# Patient Record
Sex: FEMALE | Race: WHITE | NOT HISPANIC OR LATINO | Employment: UNEMPLOYED | URBAN - METROPOLITAN AREA
[De-identification: names, ages, dates, MRNs, and addresses within clinical notes are randomized per-mention and may not be internally consistent; named-entity substitution may affect disease eponyms.]

---

## 2017-04-06 ENCOUNTER — GENERIC CONVERSION - ENCOUNTER (OUTPATIENT)
Dept: OTHER | Facility: OTHER | Age: 57
End: 2017-04-06

## 2017-04-25 ENCOUNTER — GENERIC CONVERSION - ENCOUNTER (OUTPATIENT)
Dept: OTHER | Facility: OTHER | Age: 57
End: 2017-04-25

## 2017-05-12 ENCOUNTER — ALLSCRIPTS OFFICE VISIT (OUTPATIENT)
Dept: OTHER | Facility: OTHER | Age: 57
End: 2017-05-12

## 2017-05-12 DIAGNOSIS — Z12.31 ENCOUNTER FOR SCREENING MAMMOGRAM FOR MALIGNANT NEOPLASM OF BREAST: ICD-10-CM

## 2017-08-01 ENCOUNTER — HOSPITAL ENCOUNTER (OUTPATIENT)
Dept: MAMMOGRAPHY | Facility: CLINIC | Age: 57
Discharge: HOME | End: 2017-08-01
Payer: COMMERCIAL

## 2017-08-01 DIAGNOSIS — Z12.31 ENCOUNTER FOR SCREENING MAMMOGRAM FOR MALIGNANT NEOPLASM OF BREAST: ICD-10-CM

## 2017-08-01 PROCEDURE — G0202 SCR MAMMO BI INCL CAD: HCPCS

## 2017-08-01 PROCEDURE — 77063 BREAST TOMOSYNTHESIS BI: CPT

## 2017-08-05 ENCOUNTER — GENERIC CONVERSION - ENCOUNTER (OUTPATIENT)
Dept: OTHER | Facility: OTHER | Age: 57
End: 2017-08-05

## 2017-09-06 ENCOUNTER — ALLSCRIPTS OFFICE VISIT (OUTPATIENT)
Dept: OTHER | Facility: OTHER | Age: 57
End: 2017-09-06

## 2017-09-11 RX ORDER — LANOLIN ALCOHOL/MO/W.PET/CERES
CREAM (GRAM) TOPICAL DAILY
COMMUNITY
End: 2018-05-07 | Stop reason: SDUPTHER

## 2017-09-11 RX ORDER — COVID-19 ANTIGEN TEST
KIT MISCELLANEOUS AS NEEDED
COMMUNITY
End: 2022-02-21 | Stop reason: CLARIF

## 2017-09-11 RX ORDER — GABAPENTIN 300 MG/1
100 CAPSULE ORAL 3 TIMES DAILY
COMMUNITY
End: 2018-05-03 | Stop reason: CLARIF

## 2017-09-14 ENCOUNTER — ANESTHESIA EVENT (OUTPATIENT)
Dept: PERIOP | Facility: HOSPITAL | Age: 57
End: 2017-09-14
Payer: COMMERCIAL

## 2017-09-15 ENCOUNTER — HOSPITAL ENCOUNTER (OUTPATIENT)
Facility: HOSPITAL | Age: 57
Setting detail: OUTPATIENT SURGERY
Discharge: HOME | End: 2017-09-15
Attending: INTERNAL MEDICINE | Admitting: INTERNAL MEDICINE
Payer: COMMERCIAL

## 2017-09-15 ENCOUNTER — ANESTHESIA (OUTPATIENT)
Dept: PERIOP | Facility: HOSPITAL | Age: 57
End: 2017-09-15
Payer: COMMERCIAL

## 2017-09-15 ENCOUNTER — GENERIC CONVERSION - ENCOUNTER (OUTPATIENT)
Dept: OTHER | Facility: OTHER | Age: 57
End: 2017-09-15

## 2017-09-15 VITALS
BODY MASS INDEX: 24.57 KG/M2 | HEART RATE: 62 BPM | WEIGHT: 156.53 LBS | RESPIRATION RATE: 17 BRPM | SYSTOLIC BLOOD PRESSURE: 138 MMHG | DIASTOLIC BLOOD PRESSURE: 86 MMHG | OXYGEN SATURATION: 98 % | HEIGHT: 67 IN | TEMPERATURE: 97.1 F

## 2017-09-15 RX ORDER — PROPOFOL 10 MG/ML
INJECTION, EMULSION INTRAVENOUS CONTINUOUS PRN
Status: DISCONTINUED | OUTPATIENT
Start: 2017-09-15 | End: 2017-09-15

## 2017-09-15 RX ORDER — SODIUM CHLORIDE, SODIUM LACTATE, POTASSIUM CHLORIDE, CALCIUM CHLORIDE 600; 310; 30; 20 MG/100ML; MG/100ML; MG/100ML; MG/100ML
125 INJECTION, SOLUTION INTRAVENOUS CONTINUOUS
Status: DISCONTINUED | OUTPATIENT
Start: 2017-09-15 | End: 2017-09-15 | Stop reason: HOSPADM

## 2017-09-15 RX ORDER — PROPOFOL 10 MG/ML
INJECTION, EMULSION INTRAVENOUS AS NEEDED
Status: DISCONTINUED | OUTPATIENT
Start: 2017-09-15 | End: 2017-09-15 | Stop reason: SURG

## 2017-09-15 RX ADMIN — PROPOFOL 130 MG: 10 INJECTION, EMULSION INTRAVENOUS at 09:46

## 2017-09-15 RX ADMIN — PROPOFOL 20 MG: 10 INJECTION, EMULSION INTRAVENOUS at 09:49

## 2017-09-15 RX ADMIN — SODIUM CHLORIDE, POTASSIUM CHLORIDE, SODIUM LACTATE AND CALCIUM CHLORIDE 125 ML/HR: 600; 310; 30; 20 INJECTION, SOLUTION INTRAVENOUS at 09:14

## 2017-09-15 RX ADMIN — SODIUM CHLORIDE, POTASSIUM CHLORIDE, SODIUM LACTATE AND CALCIUM CHLORIDE: 600; 310; 30; 20 INJECTION, SOLUTION INTRAVENOUS at 09:44

## 2017-11-02 ENCOUNTER — ALLSCRIPTS OFFICE VISIT (OUTPATIENT)
Dept: OTHER | Facility: OTHER | Age: 57
End: 2017-11-02

## 2017-11-02 ENCOUNTER — GENERIC CONVERSION - ENCOUNTER (OUTPATIENT)
Dept: OTHER | Facility: OTHER | Age: 57
End: 2017-11-02

## 2018-01-12 VITALS
OXYGEN SATURATION: 95 % | HEIGHT: 66 IN | HEART RATE: 73 BPM | SYSTOLIC BLOOD PRESSURE: 128 MMHG | BODY MASS INDEX: 25.94 KG/M2 | WEIGHT: 161.38 LBS | DIASTOLIC BLOOD PRESSURE: 84 MMHG

## 2018-01-14 VITALS
SYSTOLIC BLOOD PRESSURE: 122 MMHG | HEART RATE: 76 BPM | DIASTOLIC BLOOD PRESSURE: 80 MMHG | WEIGHT: 165.5 LBS | HEIGHT: 66 IN | BODY MASS INDEX: 26.6 KG/M2

## 2018-01-17 NOTE — RESULT NOTES
Verified Results  MAMMO SCREENING BILATERAL W 3D & CAD [id number] 08:49AM Chele Shankar Order Number: [id number]    - Patient Instructions: To schedule this appointment, please contact Central Scheduling at [phone]  Do not wear any perfume, powder, lotion or deodorant on breast or underarm area  Please bring your doctors order, referral (if needed) and insurance information with you on the day of the test  Failure to bring this information may result in this test being rescheduled  Arrive 15 minutes prior to your appointment time to register  On the day of your test, please bring any prior mammogram or breast studies with you that were not performed at a [facility]  Failure to bring prior exams may result in your test needing to be rescheduled  Test Name Result Flag Reference   MAMMO SCREENING BILATERAL W 3D & CAD (Report)     Patient History:   Patient is postmenopausal, has history of colorectal cancer at    age 62, and had previous chemotherapy  No known family history of cancer  Patient is a former smoker  Patient's BMI is 25 4  Reason for exam: screening, asymptomatic  Mammo Screening Bilateral W DBT and CAD: August 1, 2017 - Check    In #: [de-identified]   2D/3D Procedure   3D views: Bilateral MLO view(s) were taken  2D views: Bilateral MLO and CC view(s) were taken  Technologist: MAXIMO Guerrero (R)(M)   No prior studies available for comparison  There are scattered fibroglandular densities  A combination of    mediolateral oblique 3-D tomographic slices as well as standard    two-dimensional orthogonal images were obtained  No dominant soft tissue mass or suspicious calcifications are    noted  The skin and nipple contours are within normal limits  No mammographic evidence of malignancy  ACR BI-RADSï¾® Assessments: BiRad:1 - Negative   A reminder letter will be scheduled  Recommendation:   Routine screening mammogram in 1 year       The patient is scheduled in a reminder system  8-10% of cancers will be missed on mammography  Management of a    palpable abnormality must be based on clinical grounds  Patients    will be notified of their results via letter from our facility  Accredited by [employer] and FDA       Transcription Location: MAXIMO Werner 98: [id number]     Risk Value(s):   Nithiner-Cuabdonck 10 Year: 2 800%, Tyrer-Cuzick Lifetime: 7 700%,    Myriad Table: 1 5%, LANETTE 5 Year: 1 1%, NCI Lifetime: 6 3%   Signed by:   Sascha Cruz MD   8/1/17

## 2018-01-22 VITALS
WEIGHT: 164.38 LBS | OXYGEN SATURATION: 98 % | HEART RATE: 70 BPM | DIASTOLIC BLOOD PRESSURE: 76 MMHG | HEIGHT: 67 IN | SYSTOLIC BLOOD PRESSURE: 126 MMHG | BODY MASS INDEX: 25.8 KG/M2

## 2018-05-03 PROBLEM — T45.1X5A CHEMOTHERAPY-INDUCED NEUROPATHY (HCC): Status: ACTIVE | Noted: 2017-05-12

## 2018-05-03 PROBLEM — T45.1X5A CHEMOTHERAPY-INDUCED NEUROPATHY (HCC): Chronic | Status: ACTIVE | Noted: 2017-05-12

## 2018-05-03 PROBLEM — G62.0 CHEMOTHERAPY-INDUCED NEUROPATHY (HCC): Chronic | Status: ACTIVE | Noted: 2017-05-12

## 2018-05-03 PROBLEM — Z92.21 STATUS POST CHEMORADIATION: Chronic | Status: ACTIVE | Noted: 2018-05-03

## 2018-05-03 PROBLEM — G62.0 CHEMOTHERAPY-INDUCED NEUROPATHY (HCC): Status: ACTIVE | Noted: 2017-05-12

## 2018-05-03 PROBLEM — C18.7 MALIGNANT NEOPLASM OF SIGMOID COLON (HCC): Status: ACTIVE | Noted: 2017-05-12

## 2018-05-03 PROBLEM — C18.7 MALIGNANT NEOPLASM OF SIGMOID COLON (HCC): Chronic | Status: ACTIVE | Noted: 2017-05-12

## 2018-05-03 PROBLEM — Z92.3 STATUS POST CHEMORADIATION: Chronic | Status: ACTIVE | Noted: 2018-05-03

## 2018-05-03 RX ORDER — GABAPENTIN 300 MG/1
1 CAPSULE ORAL 3 TIMES DAILY
COMMUNITY
End: 2018-05-07 | Stop reason: SDUPTHER

## 2018-05-07 ENCOUNTER — APPOINTMENT (OUTPATIENT)
Dept: LAB | Facility: CLINIC | Age: 58
End: 2018-05-07
Payer: COMMERCIAL

## 2018-05-07 ENCOUNTER — OFFICE VISIT (OUTPATIENT)
Dept: INTERNAL MEDICINE CLINIC | Facility: CLINIC | Age: 58
End: 2018-05-07
Payer: COMMERCIAL

## 2018-05-07 ENCOUNTER — TRANSCRIBE ORDERS (OUTPATIENT)
Dept: LAB | Facility: CLINIC | Age: 58
End: 2018-05-07

## 2018-05-07 VITALS
HEIGHT: 66 IN | WEIGHT: 163.6 LBS | BODY MASS INDEX: 26.29 KG/M2 | HEART RATE: 68 BPM | OXYGEN SATURATION: 95 % | SYSTOLIC BLOOD PRESSURE: 136 MMHG | DIASTOLIC BLOOD PRESSURE: 86 MMHG

## 2018-05-07 DIAGNOSIS — Z11.59 NEED FOR HEPATITIS C SCREENING TEST: ICD-10-CM

## 2018-05-07 DIAGNOSIS — R35.0 URINARY FREQUENCY: ICD-10-CM

## 2018-05-07 DIAGNOSIS — G62.0 CHEMOTHERAPY-INDUCED NEUROPATHY (HCC): Primary | Chronic | ICD-10-CM

## 2018-05-07 DIAGNOSIS — C18.7 MALIGNANT NEOPLASM OF SIGMOID COLON (HCC): Chronic | ICD-10-CM

## 2018-05-07 DIAGNOSIS — T45.1X5A CHEMOTHERAPY-INDUCED NEUROPATHY (HCC): Primary | Chronic | ICD-10-CM

## 2018-05-07 DIAGNOSIS — Z00.00 ADULT GENERAL MEDICAL EXAMINATION: ICD-10-CM

## 2018-05-07 DIAGNOSIS — Z71.3 DIETARY COUNSELING AND SURVEILLANCE: ICD-10-CM

## 2018-05-07 DIAGNOSIS — Z11.59 SCREENING EXAMINATION FOR POLIOMYELITIS: Primary | ICD-10-CM

## 2018-05-07 DIAGNOSIS — Z13.31 DEPRESSION SCREENING NEGATIVE: ICD-10-CM

## 2018-05-07 LAB
BILIRUB UR QL STRIP: NEGATIVE
CLARITY UR: CLEAR
COLOR UR: YELLOW
GLUCOSE UR STRIP-MCNC: NEGATIVE MG/DL
HGB UR QL STRIP.AUTO: NEGATIVE
KETONES UR STRIP-MCNC: NEGATIVE MG/DL
LEUKOCYTE ESTERASE UR QL STRIP: NEGATIVE
NITRITE UR QL STRIP: NEGATIVE
PH UR STRIP.AUTO: 6 [PH] (ref 4.5–8)
PROT UR STRIP-MCNC: NEGATIVE MG/DL
SP GR UR STRIP.AUTO: 1.01 (ref 1–1.03)
UROBILINOGEN UR QL STRIP.AUTO: 0.2 E.U./DL

## 2018-05-07 PROCEDURE — 36415 COLL VENOUS BLD VENIPUNCTURE: CPT

## 2018-05-07 PROCEDURE — 86803 HEPATITIS C AB TEST: CPT

## 2018-05-07 PROCEDURE — 81003 URINALYSIS AUTO W/O SCOPE: CPT | Performed by: INTERNAL MEDICINE

## 2018-05-07 PROCEDURE — 99214 OFFICE O/P EST MOD 30 MIN: CPT | Performed by: INTERNAL MEDICINE

## 2018-05-07 RX ORDER — LANOLIN ALCOHOL/MO/W.PET/CERES
1000 CREAM (GRAM) TOPICAL DAILY
Qty: 90 TABLET | Refills: 1 | Status: SHIPPED | OUTPATIENT
Start: 2018-05-07 | End: 2018-10-02

## 2018-05-07 RX ORDER — GABAPENTIN 300 MG/1
300 CAPSULE ORAL 3 TIMES DAILY
Qty: 90 CAPSULE | Refills: 3 | Status: SHIPPED | OUTPATIENT
Start: 2018-05-07 | End: 2018-07-18 | Stop reason: ALTCHOICE

## 2018-05-07 NOTE — PATIENT INSTRUCTIONS
Peripheral Neuropathy   WHAT YOU NEED TO KNOW:   What is peripheral neuropathy? Peripheral neuropathy is a condition that affects your nerves  Nerves carry information from your brain to your body  When you have neuropathy, the information does not transfer along your nerves correctly  The nerves in your legs, arms, feet, or hands are affected  It also may affect your organs, such as your lungs, stomach, bladder, or genitals  This condition may go away on its own or you may always have it  What causes peripheral neuropathy? · Trauma or pressure on the nerve: Any accident that causes nerve damage can lead to neuropathy  If you wear a cast or a splint, it may cause pressure that pinches nerves  Repeated movements, such as typing, may cause nerve pressure and pain  · Alcohol abuse:  Alcohol abuse can cause a decrease of vitamins in your body, which can cause neuropathy  · Infections: You may get neuropathy after you are exposed to certain infections, such as herpes and Lyme disease  · Health conditions or treatments:  Certain health conditions, such as rheumatoid arthritis, cancer, lupus, and inherited disorders, may cause neuropathy  Certain medical treatments, such as chemotherapy or surgery, increase your risk of peripheral neuropathy  Some medicines for heart conditions or HIV also put you at risk  Ask for more information about these or other health conditions or treatments  What are the signs and symptoms of peripheral neuropathy? Peripheral neuropathy can affect the nerves that allow you to move or to feel things  It also may affect nerves that control your body functions, such as digestion or urination   The following are common signs and symptoms:  · Pain or tingling in your legs, feet, arms, or hands that may feel sharp, stabbing, or burning     · Trouble walking or keeping your balance     · Weakness or difficulty holding things    · Loss of your sense of touch or numbness    · Bruising easily     · Trouble controlling your bladder or bowels or having sex  How is peripheral neuropathy diagnosed? Your healthcare provider will examine you and ask about your symptoms  He may ask you about your other health conditions and about your family health history  Your healthcare provider may gently touch your skin in different areas with a cotton ball or a pin  This is done to check your sense of touch  He may also check how well you can feel hot and cold  Your healthcare provider will ask you to do simple movements  For example, he may ask you to walk or to move your fingers  You may also have any of the following tests:  · Blood tests: You may need blood taken to check for conditions that may be causing your peripheral neuropathy  · An electromyography (EMG)  test measures the electrical activity of your muscles at rest and with movement  · Nerve conduction studies:  Nerve conduction studies (NCS) test how your nerves respond to stimulation  Electrodes (wires) are placed on affected areas of your body  They send electrical currents into the nerve to see how quickly it responds  · Nerve biopsies:  A sample of skin and nerve tissue is collected by biopsy  Many methods and sites may be used for a biopsy  After a site is selected and cleaned, the area is numbed  A small piece of skin is removed so that nerves in the sample can be examined  Ask for more information about nerve biopsies  How is peripheral neuropathy treated? Treatment may help relieve your pain and help you function in your daily activities  Treatment of the condition causing the peripheral neuropathy may improve your symptoms  You may need the following treatments:  · Medicines:      ¨ Antidepressants: This medicine helps to decrease or stop the symptoms of depression  It also used to help decrease pain  Take this medicine as directed  ¨ Antiseizure medicine:   This medicine is usually given to control seizures, but it also helps with nerve pain  ¨ Pain medicine: You may be given medicine to decrease pain  You may take pain medicine as a pill or apply it to your skin  Do not wait until the pain is severe before you take your medicine  · Physical therapy:  Physical and occupational therapists may help you exercise your arms, legs, and hands  They may teach you new ways to do things at home  · Transcutaneous electrical nerve stimulation: This treatment, called TENS, stimulates your nerves and may decrease your pain  Wires are attached to pads  The pads are attached to your skin  The wires send a mild current through your nerves  Do not have TENS if you have a pacemaker or are pregnant  · Brace or splint:  You may need a device that supports or holds a body part still  For example, if you have carpal tunnel syndrome, you may need to wear a wrist brace  What are the risks of peripheral neuropathy? · The needles used during EMG may cause pain when they are inserted and may leave bruises  If you have a nerve biopsy, you may lose your sense of touch in that area  You may become dependent on some medicines (you feel like you have to take them)  You may still have pain even while you are taking medicines  Peripheral neuropathy may cause permanent damage to your nerves  · You may injure yourself if you cannot control your movements  You may have a poor sense of touch and not be able to grasp things, such as zippers or keys  You may develop muscle or bone weakness and sores from lack of movement  Pain may make you feel upset or cause you to have trouble sleeping  You may get an infection or kidney disease if it is hard for you to control your bladder or bowels  You may have trouble breathing if the nerves that work with your lungs are affected  This can be life-threatening  How can I manage my peripheral neuropathy? · Avoid falls: Move with care and stand up slowly   Wear shoes that support your feet, and do not go barefoot  Ask about walking aids, such as a cane or walker  You may want to install railings or nonslip pads in your home, especially in the bathroom  Ask for more information on how to avoid falls  · Check your skin daily:  Sores can form where your skin makes contact with chairs, beds, or other body parts  They also can form under splints  Keep your skin clean, and check your skin daily for sores  · Exercise:  Ask your healthcare provider about the best exercise plan for you  Physical activity may increase your balance and strength and may decrease your pain  It is best to start exercising slowly and do more as you get stronger  When should I contact my healthcare provider? · Your pain is severe  · You cannot control your bladder  · You have trouble having sex  · You have questions or concerns about your condition or care  When should I seek immediate care or call 911? · You fall  · You cannot walk at all  CARE AGREEMENT:   You have the right to help plan your care  Learn about your health condition and how it may be treated  Discuss treatment options with your caregivers to decide what care you want to receive  You always have the right to refuse treatment  The above information is an  only  It is not intended as medical advice for individual conditions or treatments  Talk to your doctor, nurse or pharmacist before following any medical regimen to see if it is safe and effective for you  © 2017 [address] [employer] [employer] Information is for End User's use only and may not be sold, redistributed or otherwise used for commercial purposes  All illustrations and images included in CareNotes® are the copyrighted property of A D A M , Inc  or Sunil Garcia

## 2018-05-07 NOTE — PROGRESS NOTES
INTERNAL MEDICINE FOLLOW-UP OFFICE VISIT  [facility] [address] [place] [address]    NAME: Neeru Herrmann  AGE: 59 y o  SEX: female  : 1960     DATE: 2018     Assessment and Plan:     1  Chemotherapy-induced neuropathy (HCC)    Check EMG  Set up consult with neurology  Continue gabapentin  Does not want to go up on dose right now  Continue vitamin B12     - cyanocobalamin (VITAMIN B-12) 1,000 mcg tablet; Take 1 tablet (1,000 mcg total) by mouth daily  Dispense: 90 tablet; Refill: 1  - gabapentin (NEURONTIN) 300 mg capsule; Take 1 capsule (300 mg total) by mouth 3 (three) times a day  Dispense: 90 capsule; Refill: 3  - EMG 2 limb lower extremity; Future  - Ambulatory referral to Neurology; Future    2  Malignant neoplasm of sigmoid colon [facility])    Follow-up with oncology as scheduled  Needs repeat colonoscopy in 2019     3  Need for hepatitis C screening test    Check hep C antibody for screening purposes  - Hepatitis C antibody; Future    4  Urinary frequency    No evidence of renal failure or hyperglycemia  Check UA with reflex to culture  - UA w Reflex to Microscopic w Reflex to Culture -Lab Collect    5  Adult general medical examination    Discussed routine exercise and dietary recommendations were given  Should establish with GYN  Needs PAP smear and routine GYN exam     - Ambulatory referral to Obstetrics / Gynecology; Future    Return in about 6 months (around 2018) for Follow-up  Counseling:     · Medication Side Effects - Adverse side effects of medications were reviewed with the patient/guardian today: Yes  · Counseling was given regarding: Diagnostic results, Prognosis, Risks and benefits of tx options, Intructions for management, Patient and family education, Importance of tx compliance, Risk factor reductions and Impressions    · Barriers to treatment include: No identified barriers      Chief Complaint:     Chief Complaint   Patient presents with    Follow-up     6 month       History of Present Illness:     Patient presents for routine follow-up  Has a history of chemotherapy induced neuropathy and sigmoid colon cancer s/p resection  Most recent CT scan showed stability with no new findings  No evidence of any recurrent or metastatic disease  Last colonoscopy in September 2017  She is due for repeat in 2 years  She has neuropathy mostly in hands and lower extremities  Dr Breanne Khoury wanted to set her up with neurologist and get EMG, but she never ended up going  States gabapentin does not help  She also states she gets urinary frequency  Recent glucose was 63  Renal function normal  CBC normal and CEA 1 3  She denies any cardiac symptoms  Gets intermittent lower abdominal pain, but goes away quickly  No remitting or exacerbating factors  She has not had a PAP smear  She does not have a GYN  The following portions of the patient's history were reviewed and updated as appropriate: allergies, current medications, past family history, past medical history, past social history, past surgical history and problem list      Review of Systems:     Review of Systems   Constitutional: Negative for activity change, appetite change and fatigue  Respiratory: Negative for apnea, cough, chest tightness, shortness of breath and wheezing  Cardiovascular: Negative for chest pain, palpitations and leg swelling  Gastrointestinal: Positive for abdominal pain  Negative for abdominal distention, blood in stool, constipation, diarrhea, nausea and vomiting  Musculoskeletal: Negative for arthralgias, back pain, gait problem, joint swelling and myalgias  Skin: Negative for rash and wound  Neurological: Positive for numbness  Negative for dizziness, tremors, seizures, syncope, facial asymmetry, speech difficulty, weakness, light-headedness and headaches     Psychiatric/Behavioral: Negative for behavioral problems, confusion, hallucinations, sleep disturbance and suicidal ideas  The patient is not nervous/anxious  Problem List:     Patient Active Problem List   Diagnosis    Malignant neoplasm of sigmoid colon ([address] )    Chemotherapy-induced neuropathy ([address] )    Status post chemoradiation      Objective:     /86 (BP Location: Left arm, Patient Position: Sitting, Cuff Size: Standard)   Pulse 68   Ht 5' 5 75" (1 67 m)   Wt 74 2 kg (163 lb 9 6 oz)   SpO2 95%   BMI 26 61 kg/m²     Physical Exam   Constitutional: She is oriented to person, place, and time  She appears well-developed and well-nourished  No distress  Eyes: Conjunctivae are normal  Right eye exhibits no discharge  Left eye exhibits no discharge  No scleral icterus  Neck: Neck supple  No JVD present  No thyromegaly present  Cardiovascular: Normal rate, regular rhythm, normal heart sounds and intact distal pulses  Exam reveals no gallop and no friction rub  No murmur heard  Pulmonary/Chest: Effort normal and breath sounds normal  No respiratory distress  She has no wheezes  She has no rales  She exhibits no tenderness  Abdominal: Soft  Bowel sounds are normal  She exhibits no distension and no mass  There is no tenderness  There is no rebound and no guarding  Musculoskeletal: Normal range of motion  She exhibits no edema  Lymphadenopathy:     She has no cervical adenopathy  Neurological: She is alert and oriented to person, place, and time  Skin: Skin is warm and dry  She is not diaphoretic  Psychiatric: She has a normal mood and affect  Her behavior is normal      PHQ-9  Negative for depression with PHQ2 score of 0       Current Medications:     Current Outpatient Prescriptions   Medication Sig Dispense Refill    cyanocobalamin (VITAMIN B-12) 1,000 mcg tablet Take 1 tablet (1,000 mcg total) by mouth daily 90 tablet 1    gabapentin (NEURONTIN) 300 mg capsule Take 1 capsule (300 mg total) by mouth 3 (three) times a day 90 capsule 3    Naproxen Sodium (ALEVE) 220 MG CAPS Take by mouth No current facility-administered medications for this visit          Albert Jane DO  [facility]

## 2018-05-08 ENCOUNTER — TELEPHONE (OUTPATIENT)
Dept: INTERNAL MEDICINE CLINIC | Facility: CLINIC | Age: 58
End: 2018-05-08

## 2018-05-08 LAB — HCV AB SER QL: NORMAL

## 2018-05-08 NOTE — TELEPHONE ENCOUNTER
----- Message from Matilde Au DO sent at 5/8/2018 10:11 AM EDT -----  Let patient know his urinalysis was normal and she has no evidence of hepatitis C in her blood

## 2018-07-18 ENCOUNTER — OFFICE VISIT (OUTPATIENT)
Dept: NEUROLOGY | Facility: CLINIC | Age: 58
End: 2018-07-18
Payer: COMMERCIAL

## 2018-07-18 VITALS
WEIGHT: 167 LBS | HEIGHT: 67 IN | BODY MASS INDEX: 26.21 KG/M2 | HEART RATE: 62 BPM | DIASTOLIC BLOOD PRESSURE: 94 MMHG | SYSTOLIC BLOOD PRESSURE: 136 MMHG

## 2018-07-18 DIAGNOSIS — T45.1X5A CHEMOTHERAPY-INDUCED NEUROPATHY (HCC): Primary | Chronic | ICD-10-CM

## 2018-07-18 DIAGNOSIS — G62.0 CHEMOTHERAPY-INDUCED NEUROPATHY (HCC): Primary | Chronic | ICD-10-CM

## 2018-07-18 PROCEDURE — 99204 OFFICE O/P NEW MOD 45 MIN: CPT | Performed by: PSYCHIATRY & NEUROLOGY

## 2018-07-18 RX ORDER — DULOXETIN HYDROCHLORIDE 30 MG/1
30 CAPSULE, DELAYED RELEASE ORAL DAILY
Qty: 7 CAPSULE | Refills: 0 | Status: SHIPPED | OUTPATIENT
Start: 2018-07-18 | End: 2018-07-19 | Stop reason: SDUPTHER

## 2018-07-18 RX ORDER — DULOXETIN HYDROCHLORIDE 60 MG/1
60 CAPSULE, DELAYED RELEASE ORAL
Qty: 30 CAPSULE | Refills: 1 | Status: SHIPPED | OUTPATIENT
Start: 2018-07-18 | End: 2018-07-19 | Stop reason: SDUPTHER

## 2018-07-18 NOTE — PROGRESS NOTES
Consultation - Neurology   Oliverio Gomez 59 y o  female MRN: [id number]  Unit/Bed#:  Encounter: [id number]      Physician Requesting Consult: No att  providers found  59year old right handed female patient was referred by Dr Zuhair Velarde with a history of polyneuropathy  HPI:  Patient is a 69-year-old right-handed very pleasant lady who comes to us today with a history of having suffered colon cancer 2 years ago subsequent to which she had resection and chemotherapy, and during her course of chemotherapy she started experiencing painful paresthesias in both feet associated with tingling numbness and temperatures  changes  Patient claims over the last 2 years her symptoms have been gradually progressing and also complains of tingling and numbness in her hands  She claims she was seen by her oncologist and started on gabapentin for chemotherapy-induced polyneuropathy  Patient denies any weakness in the upper lower extremities and denies any neck pain or low back pain and also denies any central neurological symptoms  She has not seen any relief with the help of gabapentin and is now advised to discontinue the same  Patient denies any history of diabetes  She is on vitamin B12 supplements on a regular basis  Review of Systems:  Review of Systems   Constitutional: Negative  HENT: Negative  Eyes: Negative  Respiratory: Negative  Cardiovascular: Negative  Gastrointestinal: Positive for abdominal pain  Endocrine: Negative  Genitourinary: Positive for frequency  Musculoskeletal: Negative  Skin: Negative  Allergic/Immunologic: Negative  Neurological: Positive for dizziness, numbness and headaches  Hematological: Bruises/bleeds easily  Psychiatric/Behavioral: The patient is nervous/anxious          Historical Information   Past Medical History:   Diagnosis Date    Chemotherapy[facility] )     Chronic pain disorder     lower back    Incisional hernia     Malignant ne[facility] of [facility]moid colon [facility] [address] ) 10/2015     Past Surgical History:   Procedure Laterality Date    CHOLECYSTECTOMY      COLECTOMY      partial    COLONOSCOPY      EXPLORATORY LAPAROTOMY      HERNIA REPAIR      LAPAROSCOPIC LYSIS INTESTINAL ADHESIONS      LAPAROSCOPIC SMALL BOWEL RESECTION      GA COLONOSCOPY FLX DX W/COLLJ SPEC WHEN PFRMD N/A 9/15/2017    Procedure: COLONOSCOPY;  Surgeon: Yuliana Diamond MD;  Location: [facility]; Service: Gastroenterology    SIGMOIDOSCOPY  10/2015     Social History   History   Smoking Status    Former Smoker    Quit date: 9/15/2015   Smokeless Tobacco    Never Used     History   Alcohol Use No     History   Drug Use No       Family History:   No family history on file  No Known Allergies    Meds:  All current active meds have been reviewed    Scheduled Meds:  PRN Meds:     Physical Exam:   Objective   Vitals: There were no vitals filed for this visit  ,Body mass index is 26 55 kg/m²  General appearance: Cooperative in no acute distress  Head & neck head is atraumatic and normocephalic  Neck is supple with full range of motion  Cardiovascular: Carotid arteries-no carotid bruits  Neurologic:   Patient is alert awake oriented, high functions are intact, speech is fluent  No evidence of any aphasia or dysarthria  Cranial nerve examination reveals visual fields are full to threat, pupils equal and reactive, extraocular movements intact, fundi showed sharp disc margins, sensation in the V1 V2 V3 distribution is symmetric, no obvious facial asymmetry noted,Hearing is preserved, tongue is midline and gag is adequate, shoulder shrug is symmetric bilaterally  Motor examination reveals normal tone and bulk, no evidence of any drift to the outstretched extremities, strength is 5/5 preserved bilaterally in both upper and lower extremities, deep tendon reflexes are intact, toes are downgoing    Sensory examination to pinprick light touch is preserved in both upper and lower extremities, proprioception and vibration is diminished in both lower extremities, patient does not extinguish double simultaneous stimuli  Coordination no evidence of any finger-to-nose dysmetria  Gait is normal based Romberg sign is positive  Patient has no evidence of any spine tenderness  Assessment:  Polyneuropathy, most likely chemotherapy-induced but unlikely to be worsening after 2 years and therefore secondary other etiology should be ruled out  Plan:    Patient is advised EMG studies of both lower extremities, will also check a sed rate protein electrophoresis and Lyme titer  She is advised to discontinue gabapentin and will try her on Cymbalta 30 mg at bedtime for 1 week followed by 60 mg at bedtime daily  Patient is agreeable and she is advised to return back to see me in 2 months  7/18/2018,12:47 PM    Dictation voice to text software has been used in the creation of this document

## 2018-07-19 RX ORDER — DULOXETIN HYDROCHLORIDE 60 MG/1
60 CAPSULE, DELAYED RELEASE ORAL
Qty: 30 CAPSULE | Refills: 1 | Status: SHIPPED | OUTPATIENT
Start: 2018-07-19 | End: 2018-08-30 | Stop reason: SDUPTHER

## 2018-07-19 RX ORDER — DULOXETIN HYDROCHLORIDE 30 MG/1
30 CAPSULE, DELAYED RELEASE ORAL DAILY
Qty: 7 CAPSULE | Refills: 0 | Status: SHIPPED | OUTPATIENT
Start: 2018-07-19 | End: 2018-10-02 | Stop reason: SINTOL

## 2018-08-01 ENCOUNTER — PROCEDURE VISIT (OUTPATIENT)
Dept: NEUROLOGY | Facility: CLINIC | Age: 58
End: 2018-08-01
Payer: COMMERCIAL

## 2018-08-01 DIAGNOSIS — T45.1X5A CHEMOTHERAPY-INDUCED NEUROPATHY (HCC): Chronic | ICD-10-CM

## 2018-08-01 DIAGNOSIS — G62.0 CHEMOTHERAPY-INDUCED NEUROPATHY (HCC): Chronic | ICD-10-CM

## 2018-08-01 PROCEDURE — 95886 MUSC TEST DONE W/N TEST COMP: CPT | Performed by: PHYSICAL MEDICINE & REHABILITATION

## 2018-08-01 PROCEDURE — 95910 NRV CNDJ TEST 7-8 STUDIES: CPT | Performed by: PHYSICAL MEDICINE & REHABILITATION

## 2018-08-01 NOTE — PROGRESS NOTES
The procedure was discussed with the patient  Verbal consent was obtained after discussing risks and benefits  A sterile concentric needle electrode was used  The patient tolerated the procedure well  There were no complications  EMG BILATERAL LOWER EXTREMITY    Motor and sensory conduction studies were performed on the bilateral peroneal, tibial and sural nerves  The distal motor latencies were normal  The motor action potential amplitudes of the bilateral tibial nerves were normal   The motor action potential amplitudes of the bilateral peroneal nerves were low  Conduction velocities were normal including conduction of the peroneal nerve across the fibular head  Bilateral peroneal and tibial F waves were normal     The right sural distal sensory latency was prolonged with a low sensory action potential amplitude  The left sural sensory action potential was normal     H  reflexes were normal     Concentric needle EMG was performed in various distal and proximal muscles of the lower extremities bilaterally including EDB, tibialis anterior, gastrocnemius medius, vastus lateralis, gluteus medius and the low lumbar paraspinal regions  There is no evidence of spontaneous activity seen  Mild decreased recruitment of giant motor units was noted in the bilateral gluteus medius and EDB  The compound motor unit potentials were of normal configuration and interference patterns were full or full for effort in the remaining muscles tested  IMPRESSION: There is electrophysiologic evidence of a:    1  Bilateral mild chronic L5 radiculopathy as evidenced by the low peroneal motor amplitude and chronic denervation changes in the above-mentioned muscles  2   Mild sensory neuropathy on the right as evidenced by the abnormal sural sensory nerve conduction studies  Clinical and imaging correlation of the lumbosacral spine is suggested      REJI Mercado

## 2018-08-16 ENCOUNTER — TELEPHONE (OUTPATIENT)
Dept: INTERNAL MEDICINE CLINIC | Facility: CLINIC | Age: 58
End: 2018-08-16

## 2018-08-30 DIAGNOSIS — G62.0 CHEMOTHERAPY-INDUCED NEUROPATHY (HCC): Chronic | ICD-10-CM

## 2018-08-30 DIAGNOSIS — T45.1X5A CHEMOTHERAPY-INDUCED NEUROPATHY (HCC): Chronic | ICD-10-CM

## 2018-08-31 RX ORDER — DULOXETIN HYDROCHLORIDE 60 MG/1
CAPSULE, DELAYED RELEASE ORAL
Qty: 30 CAPSULE | Refills: 1 | Status: SHIPPED | OUTPATIENT
Start: 2018-08-31 | End: 2018-10-02 | Stop reason: SINTOL

## 2018-09-21 ENCOUNTER — APPOINTMENT (OUTPATIENT)
Dept: LAB | Facility: CLINIC | Age: 58
End: 2018-09-21
Payer: COMMERCIAL

## 2018-09-21 DIAGNOSIS — G62.0 CHEMOTHERAPY-INDUCED NEUROPATHY (HCC): Chronic | ICD-10-CM

## 2018-09-21 DIAGNOSIS — T45.1X5A CHEMOTHERAPY-INDUCED NEUROPATHY (HCC): Chronic | ICD-10-CM

## 2018-09-21 LAB — ERYTHROCYTE [SEDIMENTATION RATE] IN BLOOD: 10 MM/HOUR (ref 0–20)

## 2018-09-21 PROCEDURE — 84165 PROTEIN E-PHORESIS SERUM: CPT | Performed by: PATHOLOGY

## 2018-09-21 PROCEDURE — 86617 LYME DISEASE ANTIBODY: CPT

## 2018-09-21 PROCEDURE — 85652 RBC SED RATE AUTOMATED: CPT

## 2018-09-21 PROCEDURE — 84165 PROTEIN E-PHORESIS SERUM: CPT

## 2018-09-21 PROCEDURE — 36415 COLL VENOUS BLD VENIPUNCTURE: CPT

## 2018-09-24 LAB
ALBUMIN SERPL ELPH-MCNC: 4.37 G/DL (ref 3.5–5)
ALBUMIN SERPL ELPH-MCNC: 59.8 % (ref 52–65)
ALPHA1 GLOB SERPL ELPH-MCNC: 0.34 G/DL (ref 0.1–0.4)
ALPHA1 GLOB SERPL ELPH-MCNC: 4.7 % (ref 2.5–5)
ALPHA2 GLOB SERPL ELPH-MCNC: 0.75 G/DL (ref 0.4–1.2)
ALPHA2 GLOB SERPL ELPH-MCNC: 10.3 % (ref 7–13)
BETA GLOB ABNORMAL SERPL ELPH-MCNC: 0.42 G/DL (ref 0.4–0.8)
BETA1 GLOB SERPL ELPH-MCNC: 5.8 % (ref 5–13)
BETA2 GLOB SERPL ELPH-MCNC: 4.2 % (ref 2–8)
BETA2+GAMMA GLOB SERPL ELPH-MCNC: 0.31 G/DL (ref 0.2–0.5)
GAMMA GLOB ABNORMAL SERPL ELPH-MCNC: 1.11 G/DL (ref 0.5–1.6)
GAMMA GLOB SERPL ELPH-MCNC: 15.2 % (ref 12–22)
IGG/ALB SER: 1.49 {RATIO} (ref 1.1–1.8)
PROT PATTERN SERPL ELPH-IMP: NORMAL
PROT SERPL-MCNC: 7.3 G/DL (ref 6.4–8.2)

## 2018-09-25 LAB
B BURGDOR IGG PATRN SER IB-IMP: NEGATIVE
B BURGDOR IGM PATRN SER IB-IMP: NEGATIVE
B BURGDOR18KD IGG SER QL IB: ABNORMAL
B BURGDOR23KD IGG SER QL IB: ABNORMAL
B BURGDOR23KD IGM SER QL IB: PRESENT
B BURGDOR28KD IGG SER QL IB: ABNORMAL
B BURGDOR30KD IGG SER QL IB: ABNORMAL
B BURGDOR39KD IGG SER QL IB: ABNORMAL
B BURGDOR39KD IGM SER QL IB: ABNORMAL
B BURGDOR41KD IGG SER QL IB: ABNORMAL
B BURGDOR41KD IGM SER QL IB: ABNORMAL
B BURGDOR45KD IGG SER QL IB: ABNORMAL
B BURGDOR58KD IGG SER QL IB: ABNORMAL
B BURGDOR66KD IGG SER QL IB: ABNORMAL
B BURGDOR93KD IGG SER QL IB: ABNORMAL

## 2018-10-02 ENCOUNTER — OFFICE VISIT (OUTPATIENT)
Dept: NEUROLOGY | Facility: CLINIC | Age: 58
End: 2018-10-02
Payer: COMMERCIAL

## 2018-10-02 VITALS
WEIGHT: 165 LBS | BODY MASS INDEX: 25.9 KG/M2 | DIASTOLIC BLOOD PRESSURE: 92 MMHG | HEIGHT: 67 IN | SYSTOLIC BLOOD PRESSURE: 136 MMHG | HEART RATE: 64 BPM

## 2018-10-02 DIAGNOSIS — T45.1X5A CHEMOTHERAPY-INDUCED NEUROPATHY (HCC): Primary | Chronic | ICD-10-CM

## 2018-10-02 DIAGNOSIS — G62.0 CHEMOTHERAPY-INDUCED NEUROPATHY (HCC): Primary | Chronic | ICD-10-CM

## 2018-10-02 PROCEDURE — 99213 OFFICE O/P EST LOW 20 MIN: CPT | Performed by: PSYCHIATRY & NEUROLOGY

## 2018-10-02 RX ORDER — OXCARBAZEPINE 150 MG/1
150 TABLET, FILM COATED ORAL 2 TIMES DAILY
Qty: 60 TABLET | Refills: 3 | Status: SHIPPED | OUTPATIENT
Start: 2018-10-02 | End: 2019-06-10

## 2018-10-02 NOTE — PROGRESS NOTES
Progress Note - Neurology   Duyen Goldsmith 59 y o  female MRN: [id number]  Unit/Bed#:  Encounter: [id number]      Subjective:   Patient is here for a follow-up visit with a history of polyneuropathy, and since her last visit had blood work done which was unremarkable, which included a sed rate Lyme titer and protein electrophoresis, patient did not see any relief with the help of Cymbalta and decided to discontinue the same since she was also experiencing intermittent palpitations  Patient continues to experience numbness in both her feet, complains of discomfort in both her feet associated with paresthesias mostly at night  Since her last visit she also had EMG studies done in the lower extremities which showed evidence of mild sensory neuropathy more prominent on the right associated with bilateral lumbar radiculopathy at L5  Patient denies any history of low back pain and denies any pain radiating from the back into the lower extremities  ROS:   Review of Systems   Constitutional: Negative  Negative for appetite change and fever  HENT: Negative  Negative for hearing loss, tinnitus, trouble swallowing and voice change  Eyes: Negative  Negative for photophobia and pain  Respiratory: Negative  Negative for shortness of breath  Cardiovascular: Negative  Negative for palpitations  Gastrointestinal: Negative  Negative for nausea and vomiting  Endocrine: Negative  Negative for cold intolerance and heat intolerance  Genitourinary: Negative  Negative for dysuria, frequency and urgency  Musculoskeletal: Negative  Negative for back pain, gait problem, myalgias and neck pain  Skin: Negative  Negative for rash  Neurological: Positive for numbness and headaches  Negative for dizziness, tremors, seizures, syncope, facial asymmetry, speech difficulty, weakness and light-headedness  Hematological: Negative  Does not bruise/bleed easily  Psychiatric/Behavioral: Negative    Negative for confusion, hallucinations and sleep disturbance  Vitals:    10/02/18 1300   BP: 136/92   BP Location: Left arm   Patient Position: Sitting   Cuff Size: Adult   Pulse: 64   Weight: 74 8 kg (165 lb)   Height: 5' 6 5" (1 689 m)       MEDS:      Current Outpatient Prescriptions:     Naproxen Sodium (ALEVE) 220 MG CAPS, Take by mouth as needed  , Disp: , Rfl:   :    Physical Exam:  General appearance: alert, appears stated age and cooperative  Head: Normocephalic, without obvious abnormality, atraumatic    Neurologic:  On examination there is no evidence of any focal motor weakness in the upper lower extremities, deep tendon reflexes are 1+ bilaterally, patient has evidence of sensory loss diffusely in both feet up to the ankles  Her gait is normal based with a mild sway on Romberg testing  Lab Results: I have personally reviewed pertinent reports  Imaging Studies: I have personally reviewed pertinent reports  Assessment:  1  Polyneuropathy, chemotherapy-induced  Plan:  Patient does not wish to try Lyrica at this time due to the cost factor, will give her a trial of Trileptal 150 mg twice a day  She is advised to call me in the next 2 weeks if she sees no relief of symptoms, the side effects of the medication was discussed and patient will return back to see me in 2 months  10/2/2018,1:06 PM    Dictation voice to text software has been used in the creation of this document  Please consider this in light of any contextual or grammatical errors

## 2018-11-13 ENCOUNTER — OFFICE VISIT (OUTPATIENT)
Dept: INTERNAL MEDICINE CLINIC | Facility: CLINIC | Age: 58
End: 2018-11-13
Payer: COMMERCIAL

## 2018-11-13 VITALS
WEIGHT: 164 LBS | DIASTOLIC BLOOD PRESSURE: 82 MMHG | SYSTOLIC BLOOD PRESSURE: 132 MMHG | BODY MASS INDEX: 25.74 KG/M2 | HEIGHT: 67 IN | HEART RATE: 68 BPM

## 2018-11-13 DIAGNOSIS — Z92.3 STATUS POST CHEMORADIATION: Chronic | ICD-10-CM

## 2018-11-13 DIAGNOSIS — G62.0 CHEMOTHERAPY-INDUCED NEUROPATHY (HCC): Chronic | ICD-10-CM

## 2018-11-13 DIAGNOSIS — Z92.21 STATUS POST CHEMORADIATION: Chronic | ICD-10-CM

## 2018-11-13 DIAGNOSIS — T45.1X5A CHEMOTHERAPY-INDUCED NEUROPATHY (HCC): Chronic | ICD-10-CM

## 2018-11-13 DIAGNOSIS — C18.7 MALIGNANT NEOPLASM OF SIGMOID COLON (HCC): Primary | Chronic | ICD-10-CM

## 2018-11-13 PROCEDURE — 99214 OFFICE O/P EST MOD 30 MIN: CPT | Performed by: INTERNAL MEDICINE

## 2018-11-13 RX ORDER — PREGABALIN 75 MG/1
75 CAPSULE ORAL 2 TIMES DAILY
Qty: 60 CAPSULE | Refills: 0 | Status: SHIPPED | OUTPATIENT
Start: 2018-11-13 | End: 2019-06-10 | Stop reason: CLARIF

## 2018-11-13 NOTE — PROGRESS NOTES
INTERNAL MEDICINE FOLLOW-UP OFFICE VISIT  [facility] [address] [place] [address]    NAME: Kari Rojas  AGE: 59 y o  SEX: female  : 1960     DATE: 2018     Assessment and Plan:     1  Malignant neoplasm of sigmoid colon ([facility] [address] )    Status post chemo radiation  Repeat colonoscopy in 2019      2  Chemotherapy-induced neuropathy ([facility] [address] )    Will try patient on Lyrica  Side effects of Lyrica were discussed  Co-pay savings card was filled out and downloaded for patient  Instructed patient to take the co-pay savings card to the pharmacy  - pregabalin (LYRICA) 75 mg capsule; Take 1 capsule (75 mg total) by mouth 2 (two) times a day  Dispense: 60 capsule; Refill: 0    3  BMI 26 0-26 9,adult     Body mass index is 26 07 kg/m²  Discussed with patient's BMI with her  The BMI is above average  BMI counseling and education was provided to the patient  Nutrition recommendations include reducing portion sizes, decreasing overall calorie intake, 3-5 servings of fruits/vegetables daily, consuming healthier snacks and moderation in carbohydrate intake  Exercise recommendations include moderate aerobic physical activity for 150 minutes/week, exercising 3-5 times per week and joining a gym  Return in about 6 months (around 2019) for Annual Physical      Chief Complaint:     Chief Complaint   Patient presents with    Follow-up      History of Present Illness:     Patient presents for routine follow-up  Patient has a history of sigmoid colon cancer status post chemo radiation  Patient's only complaint is continued chemotherapy induced neuropathy  Patient has tried gabapentin and duloxetine in the past without significant relief  Patient was recently switched to Trileptal and she has been on that for more than a month and she also notes no improvement  Patient has been hesitant to try Lyrica due to the cost factor    Patient states that her insurance makes her pay over 300 dollars  Patient was not aware of co-pay savings cards  Previous EMG done in the lower extremity showed evidence of mild sensory neuropathy more prominent on the right associated with bilateral lumbar radiculopathy  Patient denies any current low back pain, bowel dysfunction, or bladder dysfunction  Patient notes that the symptoms are more bothersome at nighttime when she is trying to go to sleep  The following portions of the patient's history were reviewed and updated as appropriate: allergies, current medications, past family history, past medical history, past social history, past surgical history and problem list      Review of Systems:     Review of Systems   Constitutional: Negative for activity change, appetite change and fatigue  Respiratory: Negative for apnea, cough, chest tightness, shortness of breath and wheezing  Cardiovascular: Negative for chest pain, palpitations and leg swelling  Gastrointestinal: Negative for abdominal distention, abdominal pain, blood in stool, constipation, diarrhea, nausea and vomiting  Musculoskeletal: Negative for arthralgias, back pain, gait problem, joint swelling and myalgias  Skin: Negative for rash and wound  Neurological: Positive for numbness and headaches  Negative for dizziness, tremors, seizures, syncope, facial asymmetry, speech difficulty, weakness and light-headedness  Psychiatric/Behavioral: Negative for behavioral problems, confusion, hallucinations, sleep disturbance and suicidal ideas  The patient is not nervous/anxious  Problem List:     Patient Active Problem List   Diagnosis    Malignant neoplasm of sigmoid colon ([facility] [address] )    Chemotherapy-induced neuropathy ([facility] [address] )    Status post chemoradiation      Objective:     /82   Pulse 68   Ht 5' 6 5" (1 689 m)   Wt 74 4 kg (164 lb)   BMI 26 07 kg/m²     Physical Exam   Constitutional: She is oriented to person, place, and time  She appears well-developed and well-nourished  No distress  Eyes: Conjunctivae are normal  Right eye exhibits no discharge  Left eye exhibits no discharge  No scleral icterus  Neck: Neck supple  No JVD present  No thyromegaly present  Cardiovascular: Normal rate, regular rhythm and normal heart sounds  No murmur heard  Pulmonary/Chest: Effort normal and breath sounds normal  No respiratory distress  She has no wheezes  She has no rales  She exhibits no tenderness  Abdominal: Soft  Bowel sounds are normal  She exhibits no distension and no mass  There is no tenderness  There is no rebound and no guarding  No hernia  Musculoskeletal: She exhibits no edema  Lymphadenopathy:     She has no cervical adenopathy  Neurological: She is alert and oriented to person, place, and time  A sensory deficit is present  Skin: Skin is warm and dry  She is not diaphoretic  Psychiatric: She has a normal mood and affect  Her behavior is normal    Vitals reviewed      Clement Sarmiento DO  [facility]

## 2018-11-27 ENCOUNTER — TRANSCRIBE ORDERS (OUTPATIENT)
Dept: LAB | Facility: CLINIC | Age: 58
End: 2018-11-27

## 2018-11-27 ENCOUNTER — APPOINTMENT (OUTPATIENT)
Dept: LAB | Facility: CLINIC | Age: 58
End: 2018-11-27
Payer: COMMERCIAL

## 2018-11-27 ENCOUNTER — OFFICE VISIT (OUTPATIENT)
Dept: OBGYN CLINIC | Age: 58
End: 2018-11-27
Payer: COMMERCIAL

## 2018-11-27 VITALS
WEIGHT: 162 LBS | HEIGHT: 67 IN | BODY MASS INDEX: 25.43 KG/M2 | DIASTOLIC BLOOD PRESSURE: 60 MMHG | SYSTOLIC BLOOD PRESSURE: 138 MMHG

## 2018-11-27 DIAGNOSIS — Z01.419 ENCOUNTER FOR GYNECOLOGICAL EXAMINATION WITHOUT ABNORMAL FINDING: Primary | ICD-10-CM

## 2018-11-27 DIAGNOSIS — Z78.0 ASYMPTOMATIC POSTMENOPAUSAL STATUS: ICD-10-CM

## 2018-11-27 DIAGNOSIS — Z12.31 ENCOUNTER FOR SCREENING MAMMOGRAM FOR MALIGNANT NEOPLASM OF BREAST: ICD-10-CM

## 2018-11-27 DIAGNOSIS — C18.9 MALIGNANT NEOPLASM OF COLON, UNSPECIFIED PART OF COLON (HCC): Primary | ICD-10-CM

## 2018-11-27 DIAGNOSIS — C18.9 MALIGNANT NEOPLASM OF COLON, UNSPECIFIED PART OF COLON (HCC): ICD-10-CM

## 2018-11-27 LAB
ALBUMIN SERPL BCP-MCNC: 3.9 G/DL (ref 3.5–5)
ALP SERPL-CCNC: 86 U/L (ref 46–116)
ALT SERPL W P-5'-P-CCNC: 14 U/L (ref 12–78)
ANION GAP SERPL CALCULATED.3IONS-SCNC: 5 MMOL/L (ref 4–13)
AST SERPL W P-5'-P-CCNC: 13 U/L (ref 5–45)
BASOPHILS # BLD AUTO: 0.08 THOUSANDS/ΜL (ref 0–0.1)
BASOPHILS NFR BLD AUTO: 2 % (ref 0–1)
BILIRUB SERPL-MCNC: 0.49 MG/DL (ref 0.2–1)
BUN SERPL-MCNC: 13 MG/DL (ref 5–25)
CALCIUM SERPL-MCNC: 9.7 MG/DL (ref 8.3–10.1)
CEA SERPL-MCNC: 0.6 NG/ML (ref 0–3)
CHLORIDE SERPL-SCNC: 106 MMOL/L (ref 100–108)
CO2 SERPL-SCNC: 27 MMOL/L (ref 21–32)
CREAT SERPL-MCNC: 0.86 MG/DL (ref 0.6–1.3)
EOSINOPHIL # BLD AUTO: 0.12 THOUSAND/ΜL (ref 0–0.61)
EOSINOPHIL NFR BLD AUTO: 3 % (ref 0–6)
ERYTHROCYTE [DISTWIDTH] IN BLOOD BY AUTOMATED COUNT: 12.5 % (ref 11.6–15.1)
GFR SERPL CREATININE-BSD FRML MDRD: 74 ML/MIN/1.73SQ M
GLUCOSE P FAST SERPL-MCNC: 72 MG/DL (ref 65–99)
HCT VFR BLD AUTO: 43 % (ref 34.8–46.1)
HGB BLD-MCNC: 13.6 G/DL (ref 11.5–15.4)
IMM GRANULOCYTES # BLD AUTO: 0.01 THOUSAND/UL (ref 0–0.2)
IMM GRANULOCYTES NFR BLD AUTO: 0 % (ref 0–2)
LYMPHOCYTES # BLD AUTO: 1.14 THOUSANDS/ΜL (ref 0.6–4.47)
LYMPHOCYTES NFR BLD AUTO: 27 % (ref 14–44)
MCH RBC QN AUTO: 28.9 PG (ref 26.8–34.3)
MCHC RBC AUTO-ENTMCNC: 31.6 G/DL (ref 31.4–37.4)
MCV RBC AUTO: 91 FL (ref 82–98)
MONOCYTES # BLD AUTO: 0.37 THOUSAND/ΜL (ref 0.17–1.22)
MONOCYTES NFR BLD AUTO: 9 % (ref 4–12)
NEUTROPHILS # BLD AUTO: 2.51 THOUSANDS/ΜL (ref 1.85–7.62)
NEUTS SEG NFR BLD AUTO: 59 % (ref 43–75)
NRBC BLD AUTO-RTO: 0 /100 WBCS
PLATELET # BLD AUTO: 171 THOUSANDS/UL (ref 149–390)
PMV BLD AUTO: 11.6 FL (ref 8.9–12.7)
POTASSIUM SERPL-SCNC: 3.4 MMOL/L (ref 3.5–5.3)
PROT SERPL-MCNC: 7.7 G/DL (ref 6.4–8.2)
RBC # BLD AUTO: 4.71 MILLION/UL (ref 3.81–5.12)
SODIUM SERPL-SCNC: 138 MMOL/L (ref 136–145)
WBC # BLD AUTO: 4.23 THOUSAND/UL (ref 4.31–10.16)

## 2018-11-27 PROCEDURE — 99386 PREV VISIT NEW AGE 40-64: CPT | Performed by: NURSE PRACTITIONER

## 2018-11-27 PROCEDURE — 87624 HPV HI-RISK TYP POOLED RSLT: CPT | Performed by: NURSE PRACTITIONER

## 2018-11-27 PROCEDURE — 85025 COMPLETE CBC W/AUTO DIFF WBC: CPT

## 2018-11-27 PROCEDURE — 36415 COLL VENOUS BLD VENIPUNCTURE: CPT

## 2018-11-27 PROCEDURE — 82378 CARCINOEMBRYONIC ANTIGEN: CPT

## 2018-11-27 PROCEDURE — 80053 COMPREHEN METABOLIC PANEL: CPT

## 2018-11-27 PROCEDURE — G0145 SCR C/V CYTO,THINLAYER,RESCR: HCPCS | Performed by: NURSE PRACTITIONER

## 2018-11-27 NOTE — PROGRESS NOTES
Assessment/Plan:    No problem-specific Assessment & Plan notes found for this encounter  Diagnoses and all orders for this visit:    Encounter for gynecological examination without abnormal finding  -     Liquid-based pap, screening    Asymptomatic postmenopausal status    Encounter for screening mammogram for malignant neoplasm of breast  -     Mammo screening bilateral w 3d & cad; Future      Calcium/vit d supplementation, natural lubes list given, call with any bleeding or spotting  Subjective:      Patient ID: Kaleb Henry is a 59 y o  female  Pleasant 59 y o  postmenopausal female here for annual exam  She denies postmenopausal bleeding  Denies history of abnormal pap smears, last Pap 10 yrs ago?,  pap today  Denies vaginal issues  Denies pelvic pain  Denies postmenopausal issues  Occasionally sexually active  [facility] showed [facility] ca 2015[facility]  The following portions of the patient's history were reviewed and updated as appropriate:   She  has a past medical history of Chemotherapy-induced neuropathy ([facility] [address] ); Chronic pain disorder; Incisional hernia; and Malignant neoplasm of sigmoid colon ([facility] [address] ) (10/2015)  She   Patient Active Problem List    Diagnosis Date Noted    Encounter for gynecological examination without abnormal finding 11/27/2018    Asymptomatic postmenopausal status 11/27/2018    Status post chemoradiation 05/03/2018    Malignant neoplasm of sigmoid colon ([address] ) 05/12/2017    Chemotherapy-induced neuropathy ([facility] [address] ) 05/12/2017     She  has a past surgical history that includes Hernia repair; Colectomy; Laparoscopic small bowel resection; Sigmoidoscopy (10/2015); Laparoscopic lysis intestinal adhesions; Colonoscopy; pr colonoscopy flx dx w/collj spec when pfrmd (N/A, 9/15/2017); and Exploratory laparotomy  Her Family history is unknown by patient  She  reports that she quit smoking about 3 years ago   She has never used smokeless tobacco  She Patient returned to floor at this time. Patient stable on arrival.   reports that she does not drink alcohol or use drugs  Current Outpatient Prescriptions   Medication Sig Dispense Refill    Naproxen Sodium (ALEVE) 220 MG CAPS Take by mouth as needed        OXcarbazepine (TRILEPTAL) 150 mg tablet Take 1 tablet (150 mg total) by mouth 2 (two) times a day 60 tablet 3    pregabalin (LYRICA) 75 mg capsule Take 1 capsule (75 mg total) by mouth 2 (two) times a day (Patient not taking: Reported on 2018 ) 60 capsule 0     No current facility-administered medications for this visit  She has No Known Allergies  OB History    Para Term  AB Living   1 1 1 0 0 1   SAB TAB Ectopic Multiple Live Births   0 0 0 0 1      # Outcome Date [place] Lbr Bryan/2nd Weight Sex Delivery Anes PTL Lv   1 Term                 Daughter has 2 grandsons 11 and 15 yo in [place]  Works as a kousekeeper  Review of Systems      Objective:      /60 (BP Location: Right arm, Patient Position: Sitting)   Ht 5' 7" (1 702 m)   Wt 73 5 kg (162 lb)   Breastfeeding? No   BMI 25 37 kg/m²          Physical Exam    Review of Systems   Constitutional: Negative for chills, fatigue, fever and unexpected weight change  Respiratory: Negative for shortness of breath  Gastrointestinal: Negative for anal bleeding, blood in stool, constipation and diarrhea  Genitourinary: Negative for difficulty urinating, dysuria and hematuria  Physical Exam   Constitutional: She appears well-developed and well-nourished  No distress  HENT:   Head: Normocephalic  Neck: Normal range of motion  Neck supple  Pulmonary: Effort normal   Breasts: bilateral without masses, skin changes or nipple discharge  Bilaterally soft and warm to touch  No areas of erythema or pain  Abdominal: Soft  Pelvic exam was performed with patient supine  No labial fusion  There is no rash, tenderness, lesion or injury on the right labia  There is no rash, tenderness, lesion or injury on the left labia   Uterus is not deviated, not enlarged, not fixed and not tender  Cervix exhibits no motion tenderness, no discharge and no friability  Right adnexum displays no mass, no tenderness and no fullness  Left adnexum displays no mass, no tenderness and no fullness  No erythema or tenderness in the vagina  No foreign body in the vagina  No signs of injury around the vagina  No vaginal discharge found  Lymphadenopathy:        Right: No inguinal adenopathy present  Left: No inguinal adenopathy present     Mildly vaginally atrophic

## 2018-11-27 NOTE — PATIENT INSTRUCTIONS
Calcium/Vitamin D Supplement (By mouth)   Calcium (CODY-see-um), Vitamin D (VYE-ta-min D)  Supplies your body with calcium if you need more than you get in your diet  Calcium helps prevent osteoporosis (weak or brittle bones)  Vitamin D helps your body use the calcium  Calcium and vitamin D are minerals that your body needs to work properly  Brand Name(s): Felice-Citrate, Felice-Citrate Plus Vitamin D, Calcet Petites, Calcium 600MG+D, Calcium Citrate +D3 Maximum, Caltrate 600 + D, Citracal + D, Citracal Calcium Citrate Petites with Vitamin D, Citracal Petites, Citracal Ultradense Calcium Citrate, Citracal Ultradense Calcium Citrate Petite w/Vit D, Citrus Calcium with Vitamin D, D-1000, D-2000, D3-400IU   There may be other brand names for this medicine  When This Medicine Should Not Be Used: You should not use this medicine if you have had an allergic reaction to calcium or vitamin D (ergocalciferol)  How to Use This Medicine:   Tablet, Long Acting Tablet, Fizzy Tablet, Liquid Filled Capsule, Chewable Tablet  · Your doctor will tell you how much medicine to use  Do not use more than directed  · Follow the instructions on the medicine label if you are using this medicine without a prescription  Ask your pharmacist or health caregiver if you are not sure how much calcium you should take in one day  · Most calcium supplements should be taken with food, but some kinds of calcium (such as calcium citrate) can be taken with or without food  Ask your health care provider or read the label on the bottle to see if you need to take your specific kind of calcium with food  Drink a full glass of water (8 ounces) with each dose  · If you are using the effervescent (fizzy) tablet, dissolve the tablet in about 6 to 8 ounces of water (3/4 cup to 1 cup)  After the tablet is completely dissolved, drink this mixture right away  Do not save any mixture to take later    · Carefully follow your doctor's instructions about any special diet   · If you need to take more than one dose each a day, take each dose at evenly spaced times, unless your doctor has told you otherwise  If a dose is missed:   · Take a dose as soon as you remember  If it is almost time for your next dose, wait until then and take a regular dose  Do not take extra medicine to make up for a missed dose  How to Store and Dispose of This Medicine:   · Store the medicine in a closed container at room temperature, away from heat, moisture, and direct light  If the effervescent (fizzy) tablet comes packaged in foil, do not open the foil until you are ready to use each tablet  · Ask your pharmacist, doctor, or health caregiver about the best way to dispose of any outdated medicine or medicine no longer needed  · Keep all medicine out of the reach of children  Never share your medicine with anyone  Drugs and Foods to Avoid:   Ask your doctor or pharmacist before using any other medicine, including over-the-counter medicines, vitamins, and herbal products  · Make sure your doctor knows if you are also using other supplements or medicines that contain calcium  Tell your doctor if you are also using gallium nitrate (Ganite®), cellulose sodium phosphate (Calcibind®), or etidronate (Didronel®)  · Calcium can change the way other medicines work if you take them at the same time  If you need to use other medicines, take them at least 2 hours before or 2 hours after you take your calcium supplement  This is particularly important if you are also using phenytoin (Dilantin®) or a tetracycline antibiotic to treat an infection (such as doxycycline, minocycline, Vibramycin®)  · Do not take your calcium supplement with a high-fiber meal (such as bran, whole-grain cereal or bread, fresh fruits)  Do not smoke cigarettes or cigars  Do not drink large amounts of alcohol or caffeine (for example, more than about 8 cups of coffee)    Warnings While Using This Medicine:   · Make sure your doctor knows if you are pregnant or breast feeding, or if you have kidney disease or have ever had kidney stones  Tell your doctor if you have had problems with too much calcium (hypercalcemia) or too little calcium in your blood (hypocalcemia)  Some health problems that can cause hypercalcemia are sarcoidosis, or problems with your parathyroid gland  · You should not use certain brands of this medicine if you have kidney disease or are on dialysis, because they may harm your kidneys  Ask your caregiver what brands are best for you  · Some health problems can affect how much calcium you should take  Tell your doctor if you have stomach or digestion problems, such as on-going diarrhea, not absorbing nutrients properly, or not having enough acid in your stomach  · This medicine might contain phenylalanine (aspartame)  This is only a concern if you have a disorder called phenylketonuria (a problem with amino acids)  If you have this condition, talk to your doctor before using this medicine  · If you are using a large amount of calcium or using it for a long time, your doctor might need to check your blood on a regular basis  Be sure to keep all appointments  Possible Side Effects While Using This Medicine:   Call your doctor right away if you notice any of these side effects:  · Headache that will not go away, dry mouth, loss of appetite, severe constipation  If you notice other side effects that you think are caused by this medicine, tell your doctor  Call your doctor for medical advice about side effects  You may report side effects to FDA at 7-740-FDA-1261  © 2017 [address] [employer] Information is for End User's use only and may not be sold, redistributed or otherwise used for commercial purposes  The above information is an  only  It is not intended as medical advice for individual conditions or treatments   Talk to your doctor, nurse or pharmacist before following any medical regimen to see if it is safe and effective for you

## 2018-11-28 LAB
HPV HR 12 DNA CVX QL NAA+PROBE: NEGATIVE
HPV16 DNA CVX QL NAA+PROBE: NEGATIVE
HPV18 DNA CVX QL NAA+PROBE: NEGATIVE

## 2018-12-04 LAB
LAB AP GYN PRIMARY INTERPRETATION: NORMAL
Lab: NORMAL

## 2018-12-06 ENCOUNTER — HOSPITAL ENCOUNTER (OUTPATIENT)
Dept: MAMMOGRAPHY | Facility: CLINIC | Age: 58
Discharge: HOME | End: 2018-12-06
Payer: COMMERCIAL

## 2018-12-06 VITALS — HEIGHT: 66 IN | BODY MASS INDEX: 26.36 KG/M2 | WEIGHT: 164 LBS

## 2018-12-06 DIAGNOSIS — Z12.31 ENCOUNTER FOR SCREENING MAMMOGRAM FOR MALIGNANT NEOPLASM OF BREAST: ICD-10-CM

## 2018-12-06 PROCEDURE — 77063 BREAST TOMOSYNTHESIS BI: CPT

## 2018-12-06 PROCEDURE — 77067 SCR MAMMO BI INCL CAD: CPT

## 2019-02-25 ENCOUNTER — TRANSCRIBE ORDERS (OUTPATIENT)
Dept: LAB | Facility: CLINIC | Age: 59
End: 2019-02-25

## 2019-02-25 DIAGNOSIS — E53.8 BIOTIN-(PROPIONYL-COA-CARBOXYLASE) LIGASE DEFICIENCY: ICD-10-CM

## 2019-02-25 DIAGNOSIS — E03.9 MYXEDEMA HEART DISEASE: ICD-10-CM

## 2019-02-25 DIAGNOSIS — E60 CHRONIC ZINC DEFICIENCY: Primary | ICD-10-CM

## 2019-02-25 DIAGNOSIS — D57.00 HB-SS DISEASE WITH CRISIS (HCC): ICD-10-CM

## 2019-02-25 DIAGNOSIS — E61.0 COPPER DEFICIENCY: ICD-10-CM

## 2019-02-25 DIAGNOSIS — I51.9 MYXEDEMA HEART DISEASE: ICD-10-CM

## 2019-02-27 ENCOUNTER — APPOINTMENT (OUTPATIENT)
Dept: LAB | Facility: HOSPITAL | Age: 59
End: 2019-02-27
Payer: COMMERCIAL

## 2019-02-27 DIAGNOSIS — E53.8 BIOTIN-(PROPIONYL-COA-CARBOXYLASE) LIGASE DEFICIENCY: ICD-10-CM

## 2019-02-27 DIAGNOSIS — E60 CHRONIC ZINC DEFICIENCY: ICD-10-CM

## 2019-02-27 DIAGNOSIS — D57.00 HB-SS DISEASE WITH CRISIS (HCC): ICD-10-CM

## 2019-02-27 DIAGNOSIS — E03.9 MYXEDEMA HEART DISEASE: ICD-10-CM

## 2019-02-27 DIAGNOSIS — I51.9 MYXEDEMA HEART DISEASE: ICD-10-CM

## 2019-02-27 DIAGNOSIS — E61.0 COPPER DEFICIENCY: ICD-10-CM

## 2019-02-27 LAB
FOLATE SERPL-MCNC: >20 NG/ML (ref 3.1–17.5)
TSH SERPL DL<=0.05 MIU/L-ACNC: 1.64 UIU/ML (ref 0.36–3.74)
VIT B12 SERPL-MCNC: 629 PG/ML (ref 100–900)

## 2019-02-27 PROCEDURE — 86225 DNA ANTIBODY NATIVE: CPT

## 2019-02-27 PROCEDURE — 84165 PROTEIN E-PHORESIS SERUM: CPT | Performed by: PATHOLOGY

## 2019-02-27 PROCEDURE — 86255 FLUORESCENT ANTIBODY SCREEN: CPT

## 2019-02-27 PROCEDURE — 82746 ASSAY OF FOLIC ACID SERUM: CPT

## 2019-02-27 PROCEDURE — 84166 PROTEIN E-PHORESIS/URINE/CSF: CPT | Performed by: PATHOLOGY

## 2019-02-27 PROCEDURE — 82525 ASSAY OF COPPER: CPT

## 2019-02-27 PROCEDURE — 84443 ASSAY THYROID STIM HORMONE: CPT

## 2019-02-27 PROCEDURE — 82607 VITAMIN B-12: CPT

## 2019-02-27 PROCEDURE — 84630 ASSAY OF ZINC: CPT

## 2019-02-27 PROCEDURE — 84165 PROTEIN E-PHORESIS SERUM: CPT

## 2019-02-27 PROCEDURE — 86235 NUCLEAR ANTIGEN ANTIBODY: CPT

## 2019-02-27 PROCEDURE — 36415 COLL VENOUS BLD VENIPUNCTURE: CPT

## 2019-02-28 ENCOUNTER — APPOINTMENT (OUTPATIENT)
Dept: LAB | Facility: HOSPITAL | Age: 59
End: 2019-02-28
Payer: COMMERCIAL

## 2019-02-28 LAB
DSDNA AB SER-ACNC: <1 IU/ML (ref 0–9)
ENA SS-A AB SER-ACNC: <0.2 AI (ref 0–0.9)
ENA SS-B AB SER-ACNC: <0.2 AI (ref 0–0.9)

## 2019-02-28 PROCEDURE — 84166 PROTEIN E-PHORESIS/URINE/CSF: CPT | Performed by: PSYCHIATRY & NEUROLOGY

## 2019-03-01 LAB
ALBUMIN SERPL ELPH-MCNC: 4.37 G/DL (ref 3.5–5)
ALBUMIN SERPL ELPH-MCNC: 59 % (ref 52–65)
ALBUMIN UR ELPH-MCNC: 100 %
ALPHA1 GLOB MFR UR ELPH: 0 %
ALPHA1 GLOB SERPL ELPH-MCNC: 0.36 G/DL (ref 0.1–0.4)
ALPHA1 GLOB SERPL ELPH-MCNC: 4.8 % (ref 2.5–5)
ALPHA2 GLOB MFR UR ELPH: 0 %
ALPHA2 GLOB SERPL ELPH-MCNC: 0.8 G/DL (ref 0.4–1.2)
ALPHA2 GLOB SERPL ELPH-MCNC: 10.8 % (ref 7–13)
B-GLOBULIN MFR UR ELPH: 0 %
BETA GLOB ABNORMAL SERPL ELPH-MCNC: 0.44 G/DL (ref 0.4–0.8)
BETA1 GLOB SERPL ELPH-MCNC: 5.9 % (ref 5–13)
BETA2 GLOB SERPL ELPH-MCNC: 4.9 % (ref 2–8)
BETA2+GAMMA GLOB SERPL ELPH-MCNC: 0.36 G/DL (ref 0.2–0.5)
C-ANCA TITR SER IF: NORMAL TITER
COPPER SERPL-MCNC: 122 UG/DL (ref 72–166)
GAMMA GLOB ABNORMAL SERPL ELPH-MCNC: 1.08 G/DL (ref 0.5–1.6)
GAMMA GLOB MFR UR ELPH: 0 %
GAMMA GLOB SERPL ELPH-MCNC: 14.6 % (ref 12–22)
IGG/ALB SER: 1.44 {RATIO} (ref 1.1–1.8)
MYELOPEROXIDASE AB SER IA-ACNC: <9 U/ML (ref 0–9)
P-ANCA ATYPICAL TITR SER IF: NORMAL TITER
P-ANCA TITR SER IF: NORMAL TITER
PROT PATTERN SERPL ELPH-IMP: NORMAL
PROT PATTERN UR ELPH-IMP: NORMAL
PROT SERPL-MCNC: 7.4 G/DL (ref 6.4–8.2)
PROT UR-MCNC: 6 MG/DL
PROTEINASE3 AB SER IA-ACNC: <3.5 U/ML (ref 0–3.5)
ZINC SERPL-MCNC: 69 UG/DL (ref 56–134)

## 2019-03-04 ENCOUNTER — TRANSCRIBE ORDERS (OUTPATIENT)
Dept: ADMINISTRATIVE | Facility: HOSPITAL | Age: 59
End: 2019-03-04

## 2019-03-04 DIAGNOSIS — R20.0 NUMBNESS IN FEET: Primary | ICD-10-CM

## 2019-03-07 ENCOUNTER — HOSPITAL ENCOUNTER (OUTPATIENT)
Dept: CT IMAGING | Facility: HOSPITAL | Age: 59
Discharge: HOME | End: 2019-03-07
Payer: COMMERCIAL

## 2019-03-07 DIAGNOSIS — R20.0 NUMBNESS IN FEET: ICD-10-CM

## 2019-03-07 PROCEDURE — 72131 CT LUMBAR SPINE W/O DYE: CPT

## 2019-05-15 ENCOUNTER — TRANSCRIBE ORDERS (OUTPATIENT)
Dept: ADMINISTRATIVE | Facility: HOSPITAL | Age: 59
End: 2019-05-15

## 2019-05-15 ENCOUNTER — APPOINTMENT (OUTPATIENT)
Dept: LAB | Facility: HOSPITAL | Age: 59
End: 2019-05-15
Payer: COMMERCIAL

## 2019-05-15 DIAGNOSIS — D50.8 IRON DEFICIENCY ANEMIA SECONDARY TO INADEQUATE DIETARY IRON INTAKE: ICD-10-CM

## 2019-05-15 DIAGNOSIS — C18.7 MALIGNANT NEOPLASM OF SIGMOID COLON (HCC): Primary | ICD-10-CM

## 2019-05-15 DIAGNOSIS — C18.7 MALIGNANT NEOPLASM OF SIGMOID COLON (HCC): ICD-10-CM

## 2019-05-15 LAB
ALBUMIN SERPL BCP-MCNC: 3.8 G/DL (ref 3.5–5)
ALP SERPL-CCNC: 89 U/L (ref 46–116)
ALT SERPL W P-5'-P-CCNC: 13 U/L (ref 12–78)
ANION GAP SERPL CALCULATED.3IONS-SCNC: 7 MMOL/L (ref 4–13)
AST SERPL W P-5'-P-CCNC: 11 U/L (ref 5–45)
BASOPHILS # BLD AUTO: 0.08 THOUSANDS/ΜL (ref 0–0.1)
BASOPHILS NFR BLD AUTO: 2 % (ref 0–1)
BILIRUB SERPL-MCNC: 0.5 MG/DL (ref 0.2–1)
BUN SERPL-MCNC: 11 MG/DL (ref 5–25)
CALCIUM SERPL-MCNC: 9 MG/DL (ref 8.3–10.1)
CEA SERPL-MCNC: 0.8 NG/ML (ref 0–3)
CHLORIDE SERPL-SCNC: 108 MMOL/L (ref 100–108)
CO2 SERPL-SCNC: 28 MMOL/L (ref 21–32)
CREAT SERPL-MCNC: 0.79 MG/DL (ref 0.6–1.3)
EOSINOPHIL # BLD AUTO: 0.2 THOUSAND/ΜL (ref 0–0.61)
EOSINOPHIL NFR BLD AUTO: 5 % (ref 0–6)
ERYTHROCYTE [DISTWIDTH] IN BLOOD BY AUTOMATED COUNT: 12.8 % (ref 11.6–15.1)
GFR SERPL CREATININE-BSD FRML MDRD: 82 ML/MIN/1.73SQ M
GLUCOSE SERPL-MCNC: 62 MG/DL (ref 65–140)
HCT VFR BLD AUTO: 41.6 % (ref 34.8–46.1)
HGB BLD-MCNC: 13.3 G/DL (ref 11.5–15.4)
IMM GRANULOCYTES # BLD AUTO: 0.01 THOUSAND/UL (ref 0–0.2)
IMM GRANULOCYTES NFR BLD AUTO: 0 % (ref 0–2)
LYMPHOCYTES # BLD AUTO: 0.95 THOUSANDS/ΜL (ref 0.6–4.47)
LYMPHOCYTES NFR BLD AUTO: 22 % (ref 14–44)
MCH RBC QN AUTO: 28.9 PG (ref 26.8–34.3)
MCHC RBC AUTO-ENTMCNC: 32 G/DL (ref 31.4–37.4)
MCV RBC AUTO: 90 FL (ref 82–98)
MONOCYTES # BLD AUTO: 0.47 THOUSAND/ΜL (ref 0.17–1.22)
MONOCYTES NFR BLD AUTO: 11 % (ref 4–12)
NEUTROPHILS # BLD AUTO: 2.62 THOUSANDS/ΜL (ref 1.85–7.62)
NEUTS SEG NFR BLD AUTO: 60 % (ref 43–75)
NRBC BLD AUTO-RTO: 0 /100 WBCS
PLATELET # BLD AUTO: 154 THOUSANDS/UL (ref 149–390)
PMV BLD AUTO: 11.9 FL (ref 8.9–12.7)
POTASSIUM SERPL-SCNC: 3.7 MMOL/L (ref 3.5–5.3)
PROT SERPL-MCNC: 7.7 G/DL (ref 6.4–8.2)
RBC # BLD AUTO: 4.6 MILLION/UL (ref 3.81–5.12)
SODIUM SERPL-SCNC: 143 MMOL/L (ref 136–145)
WBC # BLD AUTO: 4.33 THOUSAND/UL (ref 4.31–10.16)

## 2019-05-15 PROCEDURE — 36415 COLL VENOUS BLD VENIPUNCTURE: CPT

## 2019-05-15 PROCEDURE — 80053 COMPREHEN METABOLIC PANEL: CPT

## 2019-05-15 PROCEDURE — 82378 CARCINOEMBRYONIC ANTIGEN: CPT

## 2019-05-15 PROCEDURE — 85025 COMPLETE CBC W/AUTO DIFF WBC: CPT

## 2019-06-10 ENCOUNTER — OFFICE VISIT (OUTPATIENT)
Dept: INTERNAL MEDICINE CLINIC | Facility: CLINIC | Age: 59
End: 2019-06-10
Payer: COMMERCIAL

## 2019-06-10 VITALS
BODY MASS INDEX: 26.63 KG/M2 | HEART RATE: 68 BPM | DIASTOLIC BLOOD PRESSURE: 92 MMHG | SYSTOLIC BLOOD PRESSURE: 132 MMHG | OXYGEN SATURATION: 97 % | WEIGHT: 165 LBS

## 2019-06-10 DIAGNOSIS — L20.9 ATOPIC DERMATITIS, UNSPECIFIED TYPE: ICD-10-CM

## 2019-06-10 DIAGNOSIS — M54.16 LUMBAR RADICULOPATHY: ICD-10-CM

## 2019-06-10 DIAGNOSIS — C18.7 MALIGNANT NEOPLASM OF SIGMOID COLON (HCC): Primary | Chronic | ICD-10-CM

## 2019-06-10 DIAGNOSIS — R00.2 HEART PALPITATIONS: ICD-10-CM

## 2019-06-10 PROBLEM — Z01.419 ENCOUNTER FOR GYNECOLOGICAL EXAMINATION WITHOUT ABNORMAL FINDING: Status: RESOLVED | Noted: 2018-11-27 | Resolved: 2019-06-10

## 2019-06-10 PROCEDURE — 99214 OFFICE O/P EST MOD 30 MIN: CPT | Performed by: INTERNAL MEDICINE

## 2019-06-10 RX ORDER — GABAPENTIN 300 MG/1
300 CAPSULE ORAL 4 TIMES DAILY
COMMUNITY
Start: 2017-12-01

## 2019-06-10 RX ORDER — DIPHENOXYLATE HYDROCHLORIDE AND ATROPINE SULFATE 2.5; .025 MG/1; MG/1
1 TABLET ORAL DAILY
COMMUNITY
End: 2019-12-11 | Stop reason: ALTCHOICE

## 2019-06-10 RX ORDER — TRIAMCINOLONE ACETONIDE 1 MG/G
CREAM TOPICAL 2 TIMES DAILY
Qty: 45 G | Refills: 3 | Status: SHIPPED | OUTPATIENT
Start: 2019-06-10 | End: 2019-12-11 | Stop reason: ALTCHOICE

## 2019-07-18 ENCOUNTER — TELEPHONE (OUTPATIENT)
Dept: INTERNAL MEDICINE CLINIC | Facility: CLINIC | Age: 59
End: 2019-07-18

## 2019-07-18 ENCOUNTER — HOSPITAL ENCOUNTER (OUTPATIENT)
Dept: NON INVASIVE DIAGNOSTICS | Facility: CLINIC | Age: 59
Discharge: HOME | End: 2019-07-18
Payer: COMMERCIAL

## 2019-07-18 DIAGNOSIS — R00.2 HEART PALPITATIONS: ICD-10-CM

## 2019-07-18 PROCEDURE — 93306 TTE W/DOPPLER COMPLETE: CPT

## 2019-07-18 PROCEDURE — 93306 TTE W/DOPPLER COMPLETE: CPT | Performed by: INTERNAL MEDICINE

## 2019-07-18 NOTE — TELEPHONE ENCOUNTER
----- Message from Vania Chawla DO sent at 7/18/2019  2:56 PM EDT -----  Call patient and let her know ultrasound of her heart was normal

## 2019-07-24 ENCOUNTER — CONSULT (OUTPATIENT)
Dept: CARDIOLOGY CLINIC | Facility: CLINIC | Age: 59
End: 2019-07-24
Payer: COMMERCIAL

## 2019-07-24 VITALS
OXYGEN SATURATION: 98 % | SYSTOLIC BLOOD PRESSURE: 126 MMHG | BODY MASS INDEX: 26.97 KG/M2 | HEIGHT: 66 IN | WEIGHT: 167.8 LBS | DIASTOLIC BLOOD PRESSURE: 80 MMHG | RESPIRATION RATE: 18 BRPM | HEART RATE: 73 BPM

## 2019-07-24 DIAGNOSIS — Z76.89 ENCOUNTER TO ESTABLISH CARE: Primary | ICD-10-CM

## 2019-07-24 DIAGNOSIS — R00.2 HEART PALPITATIONS: ICD-10-CM

## 2019-07-24 PROCEDURE — 99244 OFF/OP CNSLTJ NEW/EST MOD 40: CPT | Performed by: INTERNAL MEDICINE

## 2019-07-24 PROCEDURE — 93000 ELECTROCARDIOGRAM COMPLETE: CPT | Performed by: INTERNAL MEDICINE

## 2019-07-24 NOTE — PROGRESS NOTES
Cardiology Outpatient Follow up Note    Elder Scruggs 59 y o  female MRN: [id number]    07/24/19          Assessment/Plan:  1  Palpitations  · TTE:  EF 60% with no regional wall motion abnormalities, mild mitral regurgitation  · TSH and hemoglobin:  Normal  · Will evaluate further with a Holter  Exercise stress test was also advised however she would like to hold off at this time  · She was advised to stay well hydrated and keep a diary of symptom onset  1  Encounter to establish care  POCT ECG   2  Heart palpitations  Ambulatory referral to Cardiology       HPI: Elder Scruggs is a 59y o  year old female with no past cardiac history who was referred by her PCP Dr Joshua Vincent for evaluation of palpitations  She states that over the past 6-7 months she has had intermittent episodes of palpitations that occurs randomly throughout the day  She states that it feels regular and fast in nature  She denies any irregular beats  She denies any associated chest pain, presyncope or dyspnea  She thinks it may have started after taking oxcarbazepine which she was prescribed for foot numbness  She is no longer taking it however is still having symptoms  She reports that she stays well hydrated throughout the day and denies excessive caffeine or supplement use  She is not aware of any family history of cardiac disease  She is a former 1/2ppd smoker and quit 4 years ago          Patient Active Problem List   Diagnosis    Malignant [facility] sigm[facility] colon ([facility] )    Lumbar radiculopathy    Status post chemoradiation    Asymptomatic postmenopausal status       No Known Allergies      Current Outpatient Medications:     Cyanocobalamin 1000 MCG CAPS, Take by mouth, Disp: , Rfl:     gabapentin (NEURONTIN) 300 mg capsule, Take 300 mg by mouth 4 (four) times a day, Disp: , Rfl:     multivitamin (THERAGRAN) TABS, Take 1 tablet by mouth daily, Disp: , Rfl:     Naproxen Sodium (ALEVE) 220 MG CAPS, Take by mouth as needed  , Disp: , Rfl:     triamcinolone (KENALOG) 0 1 % cream, Apply topically 2 (two) times a day (Patient not taking: Reported on 7/24/2019), Disp: 45 g, Rfl: 3    Past Medical History:   Diagnosis Date    Chemotherapy-induced neuropathy (HCC)     Chronic pain disorder     lower back    History of chemotherapy 02/01/2016    Incisional hernia     Malignant neoplasm of sigmoid colon ([facility] [address] ) 10/2015       Family History   Family history unknown: Yes       Past Surgical History:   Procedure Laterality Date    COLECTOMY      partial    COLONOSCOPY      EXPLORATORY LAPAROTOMY      HERNIA REPAIR      LAPAROSCOPIC LYSIS INTESTINAL ADHESIONS      LAPAROSCOPIC SMALL BOWEL RESECTION      NE COLONOSCOPY FLX DX W/COLLJ SPEC WHEN PFRMD N/A 9/15/2017    Procedure: COLONOSCOPY;  Surgeon: Shaka Li MD;  Location: [facility]; Service: Gastroenterology    SIGMOIDOSCOPY  10/2015       Social History     Socioeconomic History    Marital status: /Civil Union     Spouse name: Not on file    Number of children: Not on file    Years of education: Not on file    Highest education level: Not on file   Occupational History    Not on file   Social Needs    Financial resource strain: Not on file    Food insecurity:     Worry: Not on file     Inability: Not on file    Transportation needs:     Medical: Not on file     Non-medical: Not on file   Tobacco Use    Smoking status: Former Smoker     Packs/day: 1 00     Years: 0 00     Pack years: 0 00     Types: Cigarettes     Last attempt to quit: 9/15/2015     Years since quitting: 3 8    Smokeless tobacco: Never Used    Tobacco comment: per pt does not remember total years as a smoker   Substance and Sexual Activity    Alcohol use: No    Drug use: No    Sexual activity: Yes     Partners: Male     Birth control/protection: Post-menopausal   Lifestyle    Physical activity:     Days per week: 7 days     Minutes per session: 10 min    Stress:  To some extent   Relationships    Social connections:     Talks on phone: Not on file     Gets together: Not on file     Attends [employer] service: Not on file     Active member of club or organization: Not on file     Attends meetings of clubs or organizations: Not on file     Relationship status: Not on file    Intimate partner violence:     Fear of current or ex partner: Not on file     Emotionally abused: Not on file     Physically abused: Not on file     Forced sexual activity: Not on file   Other Topics Concern    Not on file   Social History Narrative    Not on file       Review of Systems   Constitution: Negative for diaphoresis, weight gain and weight loss  HENT: Negative for congestion  Cardiovascular: Positive for palpitations  Negative for chest pain, dyspnea on exertion, irregular heartbeat, leg swelling, near-syncope, orthopnea, paroxysmal nocturnal dyspnea and syncope  Respiratory: Negative for shortness of breath, sleep disturbances due to breathing and snoring  Hematologic/Lymphatic: Does not bruise/bleed easily  Skin: Negative for rash  Musculoskeletal: Negative for myalgias  Gastrointestinal: Negative for nausea and vomiting  Neurological: Negative for excessive daytime sleepiness and light-headedness  Psychiatric/Behavioral: The patient is not nervous/anxious  Vitals: /80   Pulse 73   Resp 18   Ht 5' 6" (1 676 m)   Wt 76 1 kg (167 lb 12 8 oz)   SpO2 98%   BMI 27 08 kg/m²       Physical Exam:     GEN: Alert and oriented x 3, in no acute distress  Well appearing and well nourished  HEENT: Sclera anicteric, conjunctivae pink, mucous membranes moist  Oropharynx clear  NECK: Supple, no carotid bruits, no significant JVD  Trachea midline, no thyromegaly  HEART: Regular rhythm, normal S1 and S2, no murmurs, clicks, gallops or rubs  PMI nondisplaced, no thrills  LUNGS: Clear to auscultation bilaterally; no wheezes, rales, or rhonchi   No increased work of breathing or signs of respiratory distress  ABDOMEN: Soft, nontender, nondistended, normoactive bowel sounds  EXTREMITIES: Skin warm and well perfused, no clubbing, cyanosis, or edema  NEURO: No focal findings  Normal speech  Mood and affect normal    SKIN: Normal without suspicious lesions on exposed skin

## 2019-07-26 ENCOUNTER — HOSPITAL ENCOUNTER (OUTPATIENT)
Dept: NON INVASIVE DIAGNOSTICS | Facility: CLINIC | Age: 59
Discharge: HOME | End: 2019-07-26
Payer: COMMERCIAL

## 2019-07-26 DIAGNOSIS — R00.2 HEART PALPITATIONS: ICD-10-CM

## 2019-07-26 PROCEDURE — 93226 XTRNL ECG REC<48 HR SCAN A/R: CPT

## 2019-07-26 PROCEDURE — 93227 XTRNL ECG REC<48 HR R&I: CPT | Performed by: INTERNAL MEDICINE

## 2019-07-26 PROCEDURE — 93225 XTRNL ECG REC<48 HRS REC: CPT

## 2019-07-30 DIAGNOSIS — I49.3 PVC'S (PREMATURE VENTRICULAR CONTRACTIONS): Primary | ICD-10-CM

## 2019-08-07 ENCOUNTER — TELEPHONE (OUTPATIENT)
Dept: CARDIOLOGY CLINIC | Facility: CLINIC | Age: 59
End: 2019-08-07

## 2019-08-07 NOTE — TELEPHONE ENCOUNTER
S/w pt and verbally understood there was no auth needed for testing   Pt given central scheduling number to schedule test

## 2019-08-14 ENCOUNTER — HOSPITAL ENCOUNTER (OUTPATIENT)
Dept: NON INVASIVE DIAGNOSTICS | Facility: CLINIC | Age: 59
Discharge: HOME | End: 2019-08-14
Payer: COMMERCIAL

## 2019-08-14 DIAGNOSIS — I49.3 PVC'S (PREMATURE VENTRICULAR CONTRACTIONS): ICD-10-CM

## 2019-08-14 PROCEDURE — 93351 STRESS TTE COMPLETE: CPT | Performed by: INTERNAL MEDICINE

## 2019-08-14 PROCEDURE — 93350 STRESS TTE ONLY: CPT

## 2019-08-15 ENCOUNTER — TELEPHONE (OUTPATIENT)
Dept: CARDIOLOGY CLINIC | Facility: CLINIC | Age: 59
End: 2019-08-15

## 2019-08-15 NOTE — TELEPHONE ENCOUNTER
----- Message from Nacho Rao MD sent at 8/14/2019  7:20 PM EDT -----  Please let her know that her stress test was normal

## 2019-08-16 LAB
MAX DIASTOLIC BP: 96 MMHG
MAX HEART RATE: 142 BPM
MAX PREDICTED HEART RATE: 160 BPM
MAX. SYSTOLIC BP: 192 MMHG
PROTOCOL NAME: NORMAL
TARGET HR FORMULA: NORMAL
TEST INDICATION: NORMAL
TIME IN EXERCISE PHASE: NORMAL

## 2019-08-18 DIAGNOSIS — T45.1X5A CHEMOTHERAPY-INDUCED NEUROPATHY (HCC): Chronic | ICD-10-CM

## 2019-08-18 DIAGNOSIS — G62.0 CHEMOTHERAPY-INDUCED NEUROPATHY (HCC): Chronic | ICD-10-CM

## 2019-08-19 RX ORDER — DULOXETIN HYDROCHLORIDE 60 MG/1
CAPSULE, DELAYED RELEASE ORAL
Qty: 30 CAPSULE | Refills: 0 | Status: SHIPPED | OUTPATIENT
Start: 2019-08-19 | End: 2019-11-12 | Stop reason: CLARIF

## 2019-08-19 RX ORDER — OXCARBAZEPINE 150 MG/1
TABLET, FILM COATED ORAL
Qty: 60 TABLET | Refills: 2 | Status: SHIPPED | OUTPATIENT
Start: 2019-08-19 | End: 2019-11-12 | Stop reason: CLARIF

## 2019-10-24 ENCOUNTER — TELEPHONE (OUTPATIENT)
Dept: GASTROENTEROLOGY | Facility: CLINIC | Age: 59
End: 2019-10-24

## 2019-11-08 ENCOUNTER — TELEPHONE (OUTPATIENT)
Dept: INTERNAL MEDICINE CLINIC | Facility: CLINIC | Age: 59
End: 2019-11-08

## 2019-11-08 NOTE — TELEPHONE ENCOUNTER
patient is coming in for an appointment for a 5 month follow-up and to review labs, no labs were order in the patient's chart  Want her to have labs or you'll order them at her appointment?

## 2019-11-12 ENCOUNTER — TELEPHONE (OUTPATIENT)
Dept: INTERNAL MEDICINE CLINIC | Facility: CLINIC | Age: 59
End: 2019-11-12

## 2019-11-12 ENCOUNTER — OFFICE VISIT (OUTPATIENT)
Dept: INTERNAL MEDICINE CLINIC | Facility: CLINIC | Age: 59
End: 2019-11-12
Payer: COMMERCIAL

## 2019-11-12 VITALS
WEIGHT: 165.2 LBS | BODY MASS INDEX: 26.55 KG/M2 | DIASTOLIC BLOOD PRESSURE: 70 MMHG | HEART RATE: 46 BPM | OXYGEN SATURATION: 99 % | HEIGHT: 66 IN | SYSTOLIC BLOOD PRESSURE: 120 MMHG

## 2019-11-12 DIAGNOSIS — R00.1 BRADYCARDIA: ICD-10-CM

## 2019-11-12 DIAGNOSIS — Z12.31 ENCOUNTER FOR SCREENING MAMMOGRAM FOR BREAST CANCER: ICD-10-CM

## 2019-11-12 DIAGNOSIS — Z00.00 ADULT GENERAL MEDICAL EXAMINATION: Primary | ICD-10-CM

## 2019-11-12 DIAGNOSIS — C18.7 MALIGNANT NEOPLASM OF SIGMOID COLON (HCC): Chronic | ICD-10-CM

## 2019-11-12 DIAGNOSIS — I49.3 PVC (PREMATURE VENTRICULAR CONTRACTION): ICD-10-CM

## 2019-11-12 PROCEDURE — 93000 ELECTROCARDIOGRAM COMPLETE: CPT | Performed by: INTERNAL MEDICINE

## 2019-11-12 PROCEDURE — 99214 OFFICE O/P EST MOD 30 MIN: CPT | Performed by: INTERNAL MEDICINE

## 2019-11-12 NOTE — TELEPHONE ENCOUNTER
Patient was here to see Dr Susan Warren, she stated that in her visit he told her to schedule her mammogram and get a colonoscopy  I checked her chart and did not see any orders for either  Can these orders be placed and I will mail them to the patient?

## 2019-11-12 NOTE — PATIENT INSTRUCTIONS

## 2019-11-12 NOTE — PROGRESS NOTES
ADULT ANNUAL PHYSICAL   [address]    NAME: Bladimir Dobbins  AGE: 59 y o  SEX: female  : 1960     DATE: 2019     Assessment and Plan:     1  Adult general medical examination    2  Bradycardia  3  PVC (premature ventricular contraction)  - POCT ECG    4  Malignant neoplasm of sigmoid colon [facility])  - Ambulatory referral to Gastroenterology; Future    5  Encounter for screening mammogram for breast cancer  - Mammo screening bilateral w 3d & cad; Future     Immunizations and preventive care screenings were discussed with patient today  Appropriate education was printed on patient's after visit summary  Poor quality EKG capture  Staff was having difficulty capturing EKG  Patient currently asymptomatic  PVCs noted on EKG  Has follow-up with cardiology  ECHO and Holter monitor reviewed  Counseling:  Alcohol/drug use: discussed moderation in alcohol intake, the recommendations for healthy alcohol use, and avoidance of illicit drug use  Dental Health: discussed importance of regular tooth brushing, flossing, and dental visits  Injury prevention: discussed safety/seat belts, safety helmets, smoke detectors, carbon dioxide detectors, and smoking near bedding or upholstery  · Sexual health: discussed sexually transmitted diseases, partner selection, use of condoms, avoidance of unintended pregnancy, and contraceptive alternatives  BMI Counseling: Body mass index is 26 66 kg/m²  The BMI is above normal  Nutrition recommendations include decreasing portion sizes, encouraging healthy choices of fruits and vegetables, limiting drinks that contain sugar, moderation in carbohydrate intake and increasing intake of lean protein  Exercise recommendations include exercising 3-5 times per week  Return in about 10 months (around 2020) for Follow-up       Chief Complaint:     Chief Complaint   Patient presents with    Well Check     5 month      History of Present Illness:     Adult Annual Physical   Patient here for a comprehensive physical exam  The patient reports no problems  MA was concerned with bradycardia and her pulse being irregular so EKG was obtained  She has been seeing cardiology due to PVCs  She tends to have a low resting pulse  No significant abnormalities on ECHO  Holter monitor did show PVCs and average pulse of 67  She denies any presyncope symptoms  She has a history of malignant neoplasm of sigmoid colon  Last colonoscopy in 2017  She follows with [facility]  She hasn't had a recent mammogram     Diet and Physical Activity  · Diet/Nutrition: well balanced diet  · Exercise: no formal exercise  Depression Screening  PHQ-9 Depression Screening    PHQ-9:    Frequency of the following problems over the past two weeks:       Little interest or pleasure in doing things:  0 - not at all  Feeling down, depressed, or hopeless:  0 - not at all  PHQ-2 Score:  0       General Health  · Sleep: sleeps well  · Hearing: normal - bilateral   · Vision: no vision problems  · Dental: regular dental visits  Review of Systems:     Review of Systems   Constitutional: Negative for appetite change, chills, fatigue and fever  HENT: Negative for congestion, hearing loss, postnasal drip, rhinorrhea, sore throat, tinnitus and trouble swallowing  Eyes: Negative for pain, discharge, redness and visual disturbance  Respiratory: Negative for cough, chest tightness, shortness of breath and wheezing  Cardiovascular: Negative for chest pain, palpitations and leg swelling  Gastrointestinal: Negative for abdominal distention, abdominal pain, blood in stool, constipation, diarrhea, nausea and vomiting  Endocrine: Negative for cold intolerance, heat intolerance, polydipsia, polyphagia and polyuria  Genitourinary: Negative for difficulty urinating, dysuria, frequency, hematuria and urgency     Musculoskeletal: Negative for arthralgias, back pain, gait problem, joint swelling, myalgias, neck pain and neck stiffness  Skin: Negative for color change and rash  Neurological: Negative for dizziness, tremors, seizures, syncope, speech difficulty, weakness, light-headedness, numbness and headaches  Hematological: Negative for adenopathy  Does not bruise/bleed easily  Psychiatric/Behavioral: Negative for agitation, behavioral problems, confusion, hallucinations, sleep disturbance and suicidal ideas  The patient is not nervous/anxious  Past Medical History:     Past Medical History:   Diagnosis Date    Chemotherapy[facility]induced neuropathy ([facility] )     Chronic pain disorder     lower back    History of chemotherapy 2016    Incisional hernia     [facility]      Past Surgical History:     Past Surgical History:   Procedure Laterality Date    COLECTOMY      partial    COLONOSCOPY      EXPLORATORY LAPAROTOMY      HERNIA REPAIR      LAPAROSCOPIC LYSIS INTESTINAL ADHESIONS      LAPAROSCOPIC SMALL BOWEL RESECTION      IA COLONOSCOPY FLX DX W/COLLJ SPEC WHEN PFRMD N/A 9/15/2017    Procedure: COLONOSCOPY;  Surgeon: Sadaf Patel MD;  Location: [facility];   Service: Gastroenterology    SIGMOIDOSCOPY  10/2015      Social History:     Social History     Socioeconomic History    Marital status: /Civil Union     Spouse name: None    Number of children: None    Years of education: None    Highest education level: None   Occupational History    None   Social Needs    Financial resource strain: None    Food insecurity:     Worry: None     Inability: None    Transportation needs:     Medical: None     Non-medical: None   Tobacco Use    Smoking status: Former Smoker     Packs/day: 1 00     Years: 0 00     Pack years: 0 00     Types: Cigarettes     Last attempt to quit: 9/15/2015     Years since quittin 1    Smokeless tobacco: Never Used    Tobacco comment: per pt does not remember total years as a smoker   Substance and Sexual Activity    Alcohol use: No    Drug use: No    Sexual activity: Yes     Partners: Male     Birth control/protection: Post-menopausal   Lifestyle    Physical activity:     Days per week: 7 days     Minutes per session: 10 min    Stress: To some extent   Relationships    Social connections:     Talks on phone: None     Gets together: None     Attends [employer] service: None     Active member of club or organization: None     Attends meetings of clubs or organizations: None     Relationship status: None    Intimate partner violence:     Fear of current or ex partner: None     Emotionally abused: None     Physically abused: None     Forced sexual activity: None   Other Topics Concern    None   Social History Narrative    None      Family History:     Family History   Family history unknown: Yes      Current Medications:     Current Outpatient Medications   Medication Sig Dispense Refill    gabapentin (NEURONTIN) 300 mg capsule Take 300 mg by mouth 4 (four) times a day      Cyanocobalamin 1000 MCG CAPS Take by mouth      multivitamin (THERAGRAN) TABS Take 1 tablet by mouth daily      Naproxen Sodium (ALEVE) 220 MG CAPS Take by mouth as needed        triamcinolone (KENALOG) 0 1 % cream Apply topically 2 (two) times a day (Patient not taking: Reported on 7/24/2019) 45 g 3     No current facility-administered medications for this visit  Allergies:     No Known Allergies   Physical Exam:     /70   Pulse (!) 46   Ht 5' 6" (1 676 m)   Wt 74 9 kg (165 lb 3 2 oz)   SpO2 99%   BMI 26 66 kg/m²     Physical Exam   Constitutional: She is oriented to person, place, and time  She appears well-developed and well-nourished  No distress  HENT:   Head: Normocephalic and atraumatic  Eyes: Pupils are equal, round, and reactive to light  Conjunctivae and EOM are normal  Right eye exhibits no discharge  Left eye exhibits no discharge   No scleral icterus  Neck: Normal range of motion  Neck supple  No JVD present  No tracheal deviation present  No thyromegaly present  Cardiovascular: Regular rhythm, normal heart sounds and intact distal pulses  Bradycardia present  Exam reveals no gallop and no friction rub  No murmur heard  Pulmonary/Chest: Effort normal and breath sounds normal  No stridor  No respiratory distress  She has no wheezes  She has no rales  She exhibits no tenderness  Abdominal: Soft  Bowel sounds are normal  She exhibits no distension and no mass  There is no tenderness  There is no rebound and no guarding  Musculoskeletal: Normal range of motion  She exhibits no edema, tenderness or deformity  Lymphadenopathy:     She has no cervical adenopathy  Neurological: She is alert and oriented to person, place, and time  No cranial nerve deficit  She exhibits normal muscle tone  Coordination normal    Skin: Skin is warm and dry  No rash noted  She is not diaphoretic  No erythema  No pallor  Psychiatric: She has a normal mood and affect  Her behavior is normal    Vitals reviewed      Jos Blas DO  [facility]

## 2019-11-15 ENCOUNTER — TRANSCRIBE ORDERS (OUTPATIENT)
Dept: ADMINISTRATIVE | Facility: HOSPITAL | Age: 59
End: 2019-11-15

## 2019-11-15 ENCOUNTER — APPOINTMENT (OUTPATIENT)
Dept: LAB | Facility: HOSPITAL | Age: 59
End: 2019-11-15
Payer: COMMERCIAL

## 2019-11-15 DIAGNOSIS — C18.7 MALIGNANT NEOPLASM OF SIGMOID COLON (HCC): ICD-10-CM

## 2019-11-15 DIAGNOSIS — C18.7 MALIGNANT NEOPLASM OF SIGMOID COLON (HCC): Primary | ICD-10-CM

## 2019-11-15 DIAGNOSIS — D50.8 IRON DEFICIENCY ANEMIA SECONDARY TO INADEQUATE DIETARY IRON INTAKE: ICD-10-CM

## 2019-11-15 LAB
ALBUMIN SERPL BCP-MCNC: 3.7 G/DL (ref 3.5–5)
ALP SERPL-CCNC: 78 U/L (ref 46–116)
ALT SERPL W P-5'-P-CCNC: 14 U/L (ref 12–78)
ANION GAP SERPL CALCULATED.3IONS-SCNC: 11 MMOL/L (ref 4–13)
AST SERPL W P-5'-P-CCNC: 10 U/L (ref 5–45)
BASOPHILS # BLD AUTO: 0.08 THOUSANDS/ΜL (ref 0–0.1)
BASOPHILS NFR BLD AUTO: 2 % (ref 0–1)
BILIRUB SERPL-MCNC: 0.5 MG/DL (ref 0.2–1)
BUN SERPL-MCNC: 10 MG/DL (ref 5–25)
CALCIUM SERPL-MCNC: 9.1 MG/DL (ref 8.3–10.1)
CEA SERPL-MCNC: 0.8 NG/ML (ref 0–3)
CHLORIDE SERPL-SCNC: 105 MMOL/L (ref 100–108)
CO2 SERPL-SCNC: 28 MMOL/L (ref 21–32)
CREAT SERPL-MCNC: 0.77 MG/DL (ref 0.6–1.3)
EOSINOPHIL # BLD AUTO: 0.11 THOUSAND/ΜL (ref 0–0.61)
EOSINOPHIL NFR BLD AUTO: 2 % (ref 0–6)
ERYTHROCYTE [DISTWIDTH] IN BLOOD BY AUTOMATED COUNT: 12.9 % (ref 11.6–15.1)
GFR SERPL CREATININE-BSD FRML MDRD: 84 ML/MIN/1.73SQ M
GLUCOSE SERPL-MCNC: 75 MG/DL (ref 65–140)
HCT VFR BLD AUTO: 42.8 % (ref 34.8–46.1)
HGB BLD-MCNC: 13.2 G/DL (ref 11.5–15.4)
IMM GRANULOCYTES # BLD AUTO: 0.01 THOUSAND/UL (ref 0–0.2)
IMM GRANULOCYTES NFR BLD AUTO: 0 % (ref 0–2)
LYMPHOCYTES # BLD AUTO: 1.08 THOUSANDS/ΜL (ref 0.6–4.47)
LYMPHOCYTES NFR BLD AUTO: 21 % (ref 14–44)
MCH RBC QN AUTO: 27.6 PG (ref 26.8–34.3)
MCHC RBC AUTO-ENTMCNC: 30.8 G/DL (ref 31.4–37.4)
MCV RBC AUTO: 89 FL (ref 82–98)
MONOCYTES # BLD AUTO: 0.57 THOUSAND/ΜL (ref 0.17–1.22)
MONOCYTES NFR BLD AUTO: 11 % (ref 4–12)
NEUTROPHILS # BLD AUTO: 3.38 THOUSANDS/ΜL (ref 1.85–7.62)
NEUTS SEG NFR BLD AUTO: 64 % (ref 43–75)
NRBC BLD AUTO-RTO: 0 /100 WBCS
PLATELET # BLD AUTO: 164 THOUSANDS/UL (ref 149–390)
PMV BLD AUTO: 12.4 FL (ref 8.9–12.7)
POTASSIUM SERPL-SCNC: 3.9 MMOL/L (ref 3.5–5.3)
PROT SERPL-MCNC: 7.6 G/DL (ref 6.4–8.2)
RBC # BLD AUTO: 4.79 MILLION/UL (ref 3.81–5.12)
SODIUM SERPL-SCNC: 144 MMOL/L (ref 136–145)
WBC # BLD AUTO: 5.23 THOUSAND/UL (ref 4.31–10.16)

## 2019-11-15 PROCEDURE — 36415 COLL VENOUS BLD VENIPUNCTURE: CPT

## 2019-11-15 PROCEDURE — 80053 COMPREHEN METABOLIC PANEL: CPT

## 2019-11-15 PROCEDURE — 82378 CARCINOEMBRYONIC ANTIGEN: CPT

## 2019-11-15 PROCEDURE — 85025 COMPLETE CBC W/AUTO DIFF WBC: CPT

## 2019-11-18 ENCOUNTER — TELEPHONE (OUTPATIENT)
Dept: GASTROENTEROLOGY | Facility: CLINIC | Age: 59
End: 2019-11-18

## 2019-11-19 ENCOUNTER — TELEPHONE (OUTPATIENT)
Dept: GASTROENTEROLOGY | Facility: CLINIC | Age: 59
End: 2019-11-19

## 2019-12-03 ENCOUNTER — ANESTHESIA EVENT (OUTPATIENT)
Dept: GASTROENTEROLOGY | Facility: HOSPITAL | Age: 59
End: 2019-12-03

## 2019-12-03 ENCOUNTER — TRANSCRIBE ORDERS (OUTPATIENT)
Dept: ADMINISTRATIVE | Facility: HOSPITAL | Age: 59
End: 2019-12-03

## 2019-12-03 DIAGNOSIS — C18.7 CANCER OF SIGMOID COLON (HCC): Primary | ICD-10-CM

## 2019-12-03 RX ORDER — SODIUM CHLORIDE, SODIUM LACTATE, POTASSIUM CHLORIDE, CALCIUM CHLORIDE 600; 310; 30; 20 MG/100ML; MG/100ML; MG/100ML; MG/100ML
125 INJECTION, SOLUTION INTRAVENOUS CONTINUOUS
Status: CANCELLED | OUTPATIENT
Start: 2019-12-03

## 2019-12-04 ENCOUNTER — ANESTHESIA (OUTPATIENT)
Dept: GASTROENTEROLOGY | Facility: HOSPITAL | Age: 59
End: 2019-12-04

## 2019-12-04 ENCOUNTER — HOSPITAL ENCOUNTER (OUTPATIENT)
Dept: GASTROENTEROLOGY | Facility: HOSPITAL | Age: 59
Setting detail: OUTPATIENT SURGERY
Discharge: HOME | End: 2019-12-04
Attending: INTERNAL MEDICINE
Payer: COMMERCIAL

## 2019-12-04 VITALS
RESPIRATION RATE: 20 BRPM | WEIGHT: 161.16 LBS | DIASTOLIC BLOOD PRESSURE: 77 MMHG | BODY MASS INDEX: 25.9 KG/M2 | SYSTOLIC BLOOD PRESSURE: 130 MMHG | TEMPERATURE: 97.6 F | OXYGEN SATURATION: 96 % | HEIGHT: 66 IN | HEART RATE: 64 BPM

## 2019-12-04 DIAGNOSIS — C18.7 MALIGNANT NEOPLASM OF SIGMOID COLON (HCC): Chronic | ICD-10-CM

## 2019-12-04 PROCEDURE — G0105 COLORECTAL SCRN; HI RISK IND: HCPCS | Performed by: INTERNAL MEDICINE

## 2019-12-04 RX ORDER — SODIUM CHLORIDE, SODIUM LACTATE, POTASSIUM CHLORIDE, CALCIUM CHLORIDE 600; 310; 30; 20 MG/100ML; MG/100ML; MG/100ML; MG/100ML
INJECTION, SOLUTION INTRAVENOUS CONTINUOUS PRN
Status: DISCONTINUED | OUTPATIENT
Start: 2019-12-04 | End: 2019-12-04 | Stop reason: SURG

## 2019-12-04 RX ORDER — LIDOCAINE HYDROCHLORIDE 10 MG/ML
INJECTION, SOLUTION EPIDURAL; INFILTRATION; INTRACAUDAL; PERINEURAL AS NEEDED
Status: DISCONTINUED | OUTPATIENT
Start: 2019-12-04 | End: 2019-12-04 | Stop reason: SURG

## 2019-12-04 RX ORDER — PROPOFOL 10 MG/ML
INJECTION, EMULSION INTRAVENOUS AS NEEDED
Status: DISCONTINUED | OUTPATIENT
Start: 2019-12-04 | End: 2019-12-04 | Stop reason: SURG

## 2019-12-04 RX ADMIN — LIDOCAINE HYDROCHLORIDE 50 MG: 10 INJECTION, SOLUTION EPIDURAL; INFILTRATION; INTRACAUDAL; PERINEURAL at 09:58

## 2019-12-04 RX ADMIN — PROPOFOL 100 MG: 10 INJECTION, EMULSION INTRAVENOUS at 09:58

## 2019-12-04 RX ADMIN — SODIUM CHLORIDE, SODIUM LACTATE, POTASSIUM CHLORIDE, AND CALCIUM CHLORIDE: .6; .31; .03; .02 INJECTION, SOLUTION INTRAVENOUS at 09:37

## 2019-12-04 NOTE — ANESTHESIA POSTPROCEDURE EVALUATION
Post-Op Assessment Note    CV Status:  Stable  Pain Score: 0    Pain management: adequate     Mental Status:  Sleepy   Hydration Status:  Stable   PONV Controlled:  None   Airway Patency:  Patent   Post Op Vitals Reviewed: Yes      Staff: AnesthesiologistMARQUITA           /65 (12/04/19 1005)    Temp      Pulse 66 (12/04/19 1005)   Resp 12 (12/04/19 1005)    SpO2 100 % (12/04/19 1005)

## 2019-12-04 NOTE — ANESTHESIA PREPROCEDURE EVALUATION
Review of Systems/Medical History  Patient summary reviewed  Chart reviewed      Cardiovascular  Negative cardio ROS Exercise tolerance (METS): good,    Comment: LEFT VENTRICLE:  Systolic function was normal  Ejection fraction was estimated to be 60 %  There were no regional wall motion abnormalities  Left ventricular diastolic function parameters were normal      RIGHT VENTRICLE:  The size was normal   Systolic function was normal      MITRAL VALVE:  There was mild regurgitation      TRICUSPID VALVE:  There was trace regurgitation  Pulmonary artery systolic pressure was within the normal range      PULMONIC VALVE:  There was trace regurgitation    ,  Pulmonary  Negative pulmonary ROS        GI/Hepatic  Negative GI/hepatic ROS   GI malignancy, Bowel prep       Negative  ROS        Endo/Other  Negative endo/other ROS      GYN  Negative gynecology ROS          Hematology  Negative hematology ROS      Musculoskeletal  Negative musculoskeletal ROS Back pain , lumbar pain,        Neurology  Negative neurology ROS      Psychology   Negative psychology ROS   Chronic pain,            Physical Exam    Airway    Mallampati score: III  TM Distance: >3 FB  Neck ROM: full     Dental   Comment: No loose,     Cardiovascular  Comment: Negative ROS, Rhythm: regular, Rate: normal,     Pulmonary  Breath sounds clear to auscultation,     Other Findings        Anesthesia Plan  ASA Score- 2     Anesthesia Type- IV sedation with anesthesia with ASA Monitors  Additional Monitors:   Airway Plan:         Plan Factors-    Induction- intravenous  Postoperative Plan-     Informed Consent- Anesthetic plan and risks discussed with patient

## 2019-12-04 NOTE — H&P
History and Physical - [facility]  Daljit Odonnell 62 y o  female MRN: [id number]                  HPI: Daljit Odonnell is a 62y o  year old female who presents for colonoscopy for colon cancer surveillance  The patient had stage III colon cancer status post resection and adjuvant chemotherapy with negative colonoscopy in 2017      REVIEW OF SYSTEMS: Per the HPI, and otherwise unremarkable  Historical Information   Past Medical History:   Diagnosis Date    [facility]     Chronic pain disorder     lower back    Colon polyp     History of chemotherapy 2016    Incisional hernia     [facility]     Past Surgical History:   Procedure Laterality Date    COLECTOMY      partial    COLONOSCOPY      EXPLORATORY LAPAROTOMY      HERNIA REPAIR      LAPAROSCOPIC LYSIS INTESTINAL ADHESIONS      LAPAROSCOPIC SMALL BOWEL RESECTION      SD COLONOSCOPY FLX DX W/COLLJ SPEC WHEN PFRMD N/A 9/15/2017    Procedure: COLONOSCOPY;  Surgeon: Ngoc Pressley MD;  Location: [facility]; Service: Gastroenterology    SIGMOIDOSCOPY  10/2015     Social History   Social History     Substance and Sexual Activity   Alcohol Use No     Social History     Substance and Sexual Activity   Drug Use No     Social History     Tobacco Use   Smoking Status Former Smoker    Packs/day: 1 00    Years: 0 00    Pack years: 0 00    Types: Cigarettes    Last attempt to quit: 9/15/2015    Years since quittin 2   Smokeless Tobacco Never Used   Tobacco Comment    per pt does not remember total years as a smoker     Family History   Family history unknown: Yes       Meds/Allergies       (Not in a hospital admission)    No Known Allergies    Objective     Blood pressure 139/82, pulse 78, temperature 98 1 °F (36 7 °C), temperature source Temporal, resp   rate (!) 23, height 5' 6" (1 676 m), weight 73 1 kg (161 lb 2 5 oz), SpO2 99 %, not currently breastfeeding  PHYSICAL EXAM    /82   Pulse 78   Temp 98 1 °F (36 7 °C) (Temporal)   Resp (!) 23   Ht 5' 6" (1 676 m)   Wt 73 1 kg (161 lb 2 5 oz)   SpO2 99%   BMI 26 01 kg/m²       Gen: NAD  CV: RRR  CHEST: Clear  ABD: soft, NT/ND  EXT: no edema      ASSESSMENT/PLAN:  This is a 62y o  year old female here for colonoscopy, and she is stable and optimized for her procedure

## 2019-12-04 NOTE — DISCHARGE INSTRUCTIONS
Colonoscopy   WHAT YOU NEED TO KNOW:   A colonoscopy is a procedure to examine the inside of your colon (intestine) with a scope  Polyps or tissue growths may have been removed during your colonoscopy  It is normal to feel bloated and to have some abdominal discomfort  You should be passing gas  If you have hemorrhoids or you had polyps removed, you may have a small amount of bleeding  DISCHARGE INSTRUCTIONS:   Seek care immediately if:   · You have a large amount of bright red blood in your bowel movements  · Your abdomen is hard and firm and you have severe pain  · You have sudden trouble breathing  Contact your healthcare provider if:   · You develop a rash or hives  · You have a fever within 24 hours of your procedure       · You have not had a bowel movement for 3 days after your procedure  · You have questions or concerns about your condition or care  Activity:   · Do not lift, strain, or run  for 3 days after your procedure  · Rest after your procedure  You have been given medicine to relax you  Do not  drive or make important decisions until the day after your procedure  Return to your normal activity as directed  · Relieve gas and discomfort from bloating  by lying on your right side with a heating pad on your abdomen  You may need to take short walks to help the gas move out  Eat small meals until bloating is relieved  If you had polyps removed: For 7 days after your procedure:  · Do not  take aspirin  · Do not  go on long car rides  Follow up with your healthcare provider as directed:  Write down your questions so you remember to ask them during your visits  © 2017 [employer] is for End User's use only and may not be sold, redistributed or otherwise used for commercial purposes  All illustrations and images included in CareNotes® are the copyrighted property of [employer]  or Sunil Garcia    The above information is an  only  It is not intended as medical advice for individual conditions or treatments  Talk to your doctor, nurse or pharmacist before following any medical regimen to see if it is safe and effective for you

## 2019-12-09 ENCOUNTER — HOSPITAL ENCOUNTER (OUTPATIENT)
Dept: MAMMOGRAPHY | Facility: CLINIC | Age: 59
Discharge: HOME | End: 2019-12-09
Payer: COMMERCIAL

## 2019-12-09 VITALS — WEIGHT: 161 LBS | BODY MASS INDEX: 25.88 KG/M2 | HEIGHT: 66 IN

## 2019-12-09 DIAGNOSIS — Z12.31 ENCOUNTER FOR SCREENING MAMMOGRAM FOR BREAST CANCER: ICD-10-CM

## 2019-12-09 PROCEDURE — 77067 SCR MAMMO BI INCL CAD: CPT

## 2019-12-09 PROCEDURE — 77063 BREAST TOMOSYNTHESIS BI: CPT

## 2019-12-10 ENCOUNTER — TELEPHONE (OUTPATIENT)
Dept: INTERNAL MEDICINE CLINIC | Facility: CLINIC | Age: 59
End: 2019-12-10

## 2019-12-10 NOTE — TELEPHONE ENCOUNTER
----- Message from Kanchan Richardson DO sent at 12/10/2019  9:30 AM EST -----  Let patient know mammogram was normal

## 2019-12-11 ENCOUNTER — OFFICE VISIT (OUTPATIENT)
Dept: CARDIOLOGY CLINIC | Facility: CLINIC | Age: 59
End: 2019-12-11
Payer: COMMERCIAL

## 2019-12-11 VITALS
WEIGHT: 163.8 LBS | HEART RATE: 74 BPM | BODY MASS INDEX: 26.33 KG/M2 | HEIGHT: 66 IN | RESPIRATION RATE: 18 BRPM | OXYGEN SATURATION: 98 % | SYSTOLIC BLOOD PRESSURE: 128 MMHG | DIASTOLIC BLOOD PRESSURE: 80 MMHG

## 2019-12-11 DIAGNOSIS — I49.3 PVC'S (PREMATURE VENTRICULAR CONTRACTIONS): Primary | ICD-10-CM

## 2019-12-11 DIAGNOSIS — R00.2 HEART PALPITATIONS: ICD-10-CM

## 2019-12-11 PROCEDURE — 99214 OFFICE O/P EST MOD 30 MIN: CPT | Performed by: INTERNAL MEDICINE

## 2019-12-11 RX ORDER — LANOLIN ALCOHOL/MO/W.PET/CERES
CREAM (GRAM) TOPICAL DAILY
COMMUNITY

## 2019-12-11 RX ORDER — METOPROLOL SUCCINATE 25 MG/1
25 TABLET, EXTENDED RELEASE ORAL DAILY
Qty: 30 TABLET | Refills: 3 | Status: SHIPPED | OUTPATIENT
Start: 2019-12-11 | End: 2020-07-29

## 2019-12-11 NOTE — PROGRESS NOTES
Cardiology Outpatient Follow up Note    Coby Diaz 62 y o  female MRN: [id number]    12/11/19          Assessment/Plan:  1  Palpitations/PVCs  · TTE:  EF 60% with no regional wall motion abnormalities, mild mitral regurgitation  · TSH and hemoglobin:  Normal  · Holter revealed 8% ventricular ectopic burden; there was no sustained or nonsustained ventricular arrhythmia  No symptoms were reported  · Exercise stress test: exercise duration: 8 5 mins to 10 mets  There was no evidence of ischemia  · She denies symptoms consistent with sleep apnea  · Start toprol-xl 25mg/day  · She was advised to eat foods rich in potassium  1  PVC's (premature ventricular contractions)  metoprolol succinate (TOPROL-XL) 25 mg 24 hr tablet   2  Heart palpitations         HPI: Coby Diaz is a 62y o  year old female with history of palpitations and PVCs presents for routine follow-up visit  On her prior visit she complained intermittent episodes of palpitations  She denied associated chest pain, presyncope or dyspnea  She was evaluated with a transthoracic echocardiogram that revealed EF 60% with no regional wall motion abnormalities, and mild mitral regurgitation  She was further evaluated with a 24 hour Holter that revealed an 8% ventricular ectopic burden with no sustained or nonsustained ventricular arrhythmia  She also underwent an exercise stress test and her exercised for 8 5 minutes to 10 Mets  There was no evidence of ischemia  She still continues to experience intermittent palpitations  She reports that she stays well hydrated throughout the day and denies excessive caffeine or supplement use  She is not aware of any family history of cardiac disease  She is a former 1/2ppd smoker and quit 4 years ago          Patient Active Problem List   Diagnosis    Malignant neoplasm of sigmoid colon ([address] )    Lumbar radiculopathy    Status post chemoradiation    Asymptomatic postmenopausal status       No Known Allergies      Current Outpatient Medications:     gabapentin (NEURONTIN) 300 mg capsule, Take 300 mg by mouth 4 (four) times a day, Disp: , Rfl:     Naproxen Sodium (ALEVE) 220 MG CAPS, Take by mouth as needed  , Disp: , Rfl:     vitamin B-12 (VITAMIN B-12) 1,000 mcg tablet, Take by mouth daily, Disp: , Rfl:     metoprolol succinate (TOPROL-XL) 25 mg 24 hr tablet, Take 1 tablet (25 mg total) by mouth daily, Disp: 30 tablet, Rfl: 3    Past Medical History:   Diagnosis Date    Chemotherapy-induced neuropathy (HCC)     Chronic pain disorder     lower back    Colon polyp     History of chemotherapy 2016    Incisional hernia     Malignant neoplasm of sigmoid colon ([facility] [address] ) 10/2015       Family History   Family history unknown: Yes       Past Surgical History:   Procedure Laterality Date    COLECTOMY      partial    COLONOSCOPY      EXPLORATORY LAPAROTOMY      HERNIA REPAIR      LAPAROSCOPIC LYSIS INTESTINAL ADHESIONS      LAPAROSCOPIC SMALL BOWEL RESECTION      UT COLONOSCOPY FLX DX W/COLLJ SPEC WHEN PFRMD N/A 9/15/2017    Procedure: COLONOSCOPY;  Surgeon: Jhoana Gonzalez MD;  Location: [facility];   Service: Gastroenterology    SIGMOIDOSCOPY  10/2015       Social History     Socioeconomic History    Marital status: /Civil Union     Spouse name: Not on file    Number of children: Not on file    Years of education: Not on file    Highest education level: Not on file   Occupational History    Not on file   Social Needs    Financial resource strain: Not on file    Food insecurity:     Worry: Not on file     Inability: Not on file    Transportation needs:     Medical: Not on file     Non-medical: Not on file   Tobacco Use    Smoking status: Former Smoker     Packs/day: 1 00     Years: 0 00     Pack years: 0 00     Types: Cigarettes     Last attempt to quit: 9/15/2015     Years since quittin 2    Smokeless tobacco: Never Used    Tobacco comment: per pt does not remember total years as a smoker   Substance and Sexual Activity    Alcohol use: No    Drug use: No    Sexual activity: Yes     Partners: Male     Birth control/protection: Post-menopausal   Lifestyle    Physical activity:     Days per week: 7 days     Minutes per session: 10 min    Stress: To some extent   Relationships    Social connections:     Talks on phone: Not on file     Gets together: Not on file     Attends [employer] service: Not on file     Active member of club or organization: Not on file     Attends meetings of clubs or organizations: Not on file     Relationship status: Not on file    Intimate partner violence:     Fear of current or ex partner: Not on file     Emotionally abused: Not on file     Physically abused: Not on file     Forced sexual activity: Not on file   Other Topics Concern    Not on file   Social History Narrative    Not on file       Review of Systems   Constitution: Negative for diaphoresis, weight gain and weight loss  HENT: Negative for congestion  Cardiovascular: Positive for palpitations  Negative for chest pain, dyspnea on exertion, irregular heartbeat, leg swelling, near-syncope, orthopnea, paroxysmal nocturnal dyspnea and syncope  Respiratory: Negative for shortness of breath, sleep disturbances due to breathing and snoring  Hematologic/Lymphatic: Does not bruise/bleed easily  Skin: Negative for rash  Musculoskeletal: Negative for myalgias  Gastrointestinal: Negative for nausea and vomiting  Neurological: Negative for excessive daytime sleepiness and light-headedness  Psychiatric/Behavioral: The patient is not nervous/anxious  Vitals: /80   Pulse 74   Resp 18   Ht 5' 6" (1 676 m)   Wt 74 3 kg (163 lb 12 8 oz)   SpO2 98%   BMI 26 44 kg/m²       Physical Exam:     GEN: Alert and oriented x 3, in no acute distress  Well appearing and well nourished     HEENT: Sclera anicteric, conjunctivae pink, mucous membranes moist  Oropharynx clear  NECK: Supple, no carotid bruits, no significant JVD  Trachea midline, no thyromegaly  HEART: Regular rhythm, normal S1 and S2, no murmurs, clicks, gallops or rubs  PMI nondisplaced, no thrills  LUNGS: Clear to auscultation bilaterally; no wheezes, rales, or rhonchi  No increased work of breathing or signs of respiratory distress  ABDOMEN: Soft, nontender, nondistended, normoactive bowel sounds  EXTREMITIES: Skin warm and well perfused, no clubbing, cyanosis, or edema  NEURO: No focal findings  Normal speech  Mood and affect normal    SKIN: Normal without suspicious lesions on exposed skin

## 2019-12-13 ENCOUNTER — HOSPITAL ENCOUNTER (OUTPATIENT)
Dept: CT IMAGING | Facility: HOSPITAL | Age: 59
Discharge: HOME | End: 2019-12-13
Payer: COMMERCIAL

## 2019-12-13 DIAGNOSIS — C18.7 CANCER OF SIGMOID COLON (HCC): ICD-10-CM

## 2019-12-13 PROCEDURE — 74177 CT ABD & PELVIS W/CONTRAST: CPT

## 2019-12-13 PROCEDURE — 71260 CT THORAX DX C+: CPT

## 2019-12-13 RX ADMIN — IOHEXOL 100 ML: 350 INJECTION, SOLUTION INTRAVENOUS at 13:23

## 2020-03-16 ENCOUNTER — TELEPHONE (OUTPATIENT)
Dept: INTERNAL MEDICINE CLINIC | Facility: CLINIC | Age: 60
End: 2020-03-16

## 2020-03-16 NOTE — TELEPHONE ENCOUNTER
PT  CALLED AND HAS DRY COUGH AND HEADACHE  DOES NOT KNOW IF HSE HAS FEVER AS HAS NOT TAKEN TEMPETURE AND DOES  NOT IF SHE HAS BEEN AROUND SICK PEOPLE AS GOES OUT AND DOES NOT KNOW WHO IS SICK AND ISN'T HAS BEEN TAKING ALLERGY MEDS WAS OFFERED APPT  WITH PA AS DR Rodney Ovalles DOES NOT HAVE ANY APPT  S AND SHE  IS LOOKING TO BE TESTED FOR CORONA-PLEASE ADVISE

## 2020-03-16 NOTE — TELEPHONE ENCOUNTER
I returned the patient's phone call  The patient states that on  Thursday last week she felt warm and then later in the day felt like she was "on fire" but did not take her temperature  She has had an occasional cough over the past several days without documented fever  The patient states she did have 1 episode yesterday where she was coughing and coughing and could not stop and after she drink water it resolved on its own  The patient's symptoms are not worsening over the past several days  She has not traveled out of the country nor has she been in a high risk area  The patient has not had contact with a known corona patient  I did recommend the patient use either Zyrtec or Claritin for as well as Flonase nasal spray as it sounds like she may  The start of allergic rhinitis as the symptoms started after she had been outside  The patient has been encouraged to treat her symptoms at this point in time and contact our office if her symptoms worsen  The patient would like to be tested for the corona virus and I did review the CDC recommendations  at this point

## 2020-07-01 ENCOUNTER — TELEPHONE (OUTPATIENT)
Dept: INTERNAL MEDICINE CLINIC | Facility: CLINIC | Age: 60
End: 2020-07-01

## 2020-07-01 NOTE — TELEPHONE ENCOUNTER
Received medical release paperwork from her dentist to have dental work done  Her blood pressure was reported to be high in the office at 169/96 and 176/121  She needs an office visit

## 2020-07-01 NOTE — TELEPHONE ENCOUNTER
SPOKE WITH PT, SHE HAS AN APPT SCHEDULED IN SEPTEMBER AND DOES NOT WANT TO BE SEEN ANY SOONER    STATES HER BP IS FINE

## 2020-07-29 DIAGNOSIS — I49.3 PVC'S (PREMATURE VENTRICULAR CONTRACTIONS): ICD-10-CM

## 2020-07-29 RX ORDER — METOPROLOL SUCCINATE 25 MG/1
TABLET, EXTENDED RELEASE ORAL
Qty: 30 TABLET | Refills: 2 | Status: SHIPPED | OUTPATIENT
Start: 2020-07-29 | End: 2021-01-26

## 2021-01-26 DIAGNOSIS — I49.3 PVC'S (PREMATURE VENTRICULAR CONTRACTIONS): ICD-10-CM

## 2021-01-26 RX ORDER — METOPROLOL SUCCINATE 25 MG/1
TABLET, EXTENDED RELEASE ORAL
Qty: 30 TABLET | Refills: 4 | Status: SHIPPED | OUTPATIENT
Start: 2021-01-26

## 2021-04-19 ENCOUNTER — IMMUNIZATIONS (OUTPATIENT)
Dept: FAMILY MEDICINE CLINIC | Facility: HOSPITAL | Age: 61
End: 2021-04-19

## 2021-04-19 DIAGNOSIS — Z23 ENCOUNTER FOR IMMUNIZATION: Primary | ICD-10-CM

## 2021-04-19 PROCEDURE — 91300 SARS-COV-2 / COVID-19 MRNA VACCINE (PFIZER-BIONTECH) 30 MCG: CPT

## 2021-04-19 PROCEDURE — 0001A SARS-COV-2 / COVID-19 MRNA VACCINE (PFIZER-BIONTECH) 30 MCG: CPT

## 2021-05-14 ENCOUNTER — IMMUNIZATIONS (OUTPATIENT)
Dept: FAMILY MEDICINE CLINIC | Facility: HOSPITAL | Age: 61
End: 2021-05-14

## 2021-05-14 DIAGNOSIS — Z23 ENCOUNTER FOR IMMUNIZATION: Primary | ICD-10-CM

## 2021-05-14 PROCEDURE — 91300 SARS-COV-2 / COVID-19 MRNA VACCINE (PFIZER-BIONTECH) 30 MCG: CPT

## 2021-05-14 PROCEDURE — 0002A SARS-COV-2 / COVID-19 MRNA VACCINE (PFIZER-BIONTECH) 30 MCG: CPT

## 2021-11-11 ENCOUNTER — TELEPHONE (OUTPATIENT)
Dept: INTERNAL MEDICINE CLINIC | Facility: CLINIC | Age: 61
End: 2021-11-11

## 2022-02-21 ENCOUNTER — OFFICE VISIT (OUTPATIENT)
Dept: INTERNAL MEDICINE CLINIC | Facility: CLINIC | Age: 62
End: 2022-02-21

## 2022-02-21 VITALS
BODY MASS INDEX: 25.66 KG/M2 | SYSTOLIC BLOOD PRESSURE: 130 MMHG | DIASTOLIC BLOOD PRESSURE: 68 MMHG | OXYGEN SATURATION: 100 % | HEIGHT: 65 IN | WEIGHT: 154 LBS | HEART RATE: 88 BPM | RESPIRATION RATE: 20 BRPM | TEMPERATURE: 98.8 F

## 2022-02-21 DIAGNOSIS — Z00.00 ADULT GENERAL MEDICAL EXAMINATION: Primary | ICD-10-CM

## 2022-02-21 DIAGNOSIS — R21 RASH: ICD-10-CM

## 2022-02-21 DIAGNOSIS — Z78.0 ASYMPTOMATIC POSTMENOPAUSAL STATUS: ICD-10-CM

## 2022-02-21 PROCEDURE — 99396 PREV VISIT EST AGE 40-64: CPT | Performed by: INTERNAL MEDICINE

## 2022-02-21 RX ORDER — TRIAMCINOLONE ACETONIDE 1 MG/G
CREAM TOPICAL 2 TIMES DAILY
Qty: 30 G | Refills: 3 | Status: SHIPPED | OUTPATIENT
Start: 2022-02-21

## 2022-02-21 NOTE — PROGRESS NOTES
ADULT ANNUAL PHYSICAL   [facility]    NAME: Amee Aguirre  AGE: 59 y o  SEX: female  : 1960     DATE: 2022     Assessment and Plan:     Problem List Items Addressed This Visit        Other    Asymptomatic postmenopausal status    Relevant Orders    DXA bone density spine hip and pelvis      Other Visit Diagnoses     Adult general medical examination    -  Primary    Rash        Relevant Medications    triamcinolone (KENALOG) 0 1 % cream        Immunizations and preventive care screenings were discussed with patient today  Appropriate education was printed on patient's after visit summary  Counseling:  Alcohol/drug use: discussed moderation in alcohol intake, the recommendations for healthy alcohol use, and avoidance of illicit drug use  Dental Health: discussed importance of regular tooth brushing, flossing, and dental visits  Injury prevention: discussed safety/seat belts, safety helmets, smoke detectors, carbon dioxide detectors, and smoking near bedding or upholstery  Sexual health: discussed sexually transmitted diseases, partner selection, use of condoms, avoidance of unintended pregnancy, and contraceptive alternatives  · Exercise: the importance of regular exercise/physical activity was discussed  Recommend exercise 3-5 times per week for at least 30 minutes  Return in about 1 year (around 2023) for Annual Physical      Chief Complaint:     Chief Complaint   Patient presents with    Annual Exam      History of Present Illness:     Adult Annual Physical   Patient here for a comprehensive physical exam  The patient reports no problems with her health  Known history of sigmoid colon cancer  Monitored by oncology  Labs are stable  Otherwise, she feels well      Depression Screening  PHQ-2/9 Depression Screening    Little interest or pleasure in doing things: 0 - not at all  Feeling down, depressed, or hopeless: 0 - not at all  PHQ-2 Score: 0  PHQ-2 Interpretation: Negative depression screen       General Health  · Sleep: sleeps well  · Hearing: normal - bilateral   · Vision: no vision problems  · Dental: no dental visits for >1 year  /GYN Health  · Patient is: postmenopausal     Review of Systems:     Review of Systems   Constitutional: Negative for appetite change, chills, fatigue and fever  HENT: Negative for congestion, hearing loss, postnasal drip, rhinorrhea, sore throat, tinnitus and trouble swallowing  Eyes: Negative for pain, discharge, redness and visual disturbance  Respiratory: Negative for cough, chest tightness, shortness of breath and wheezing  Cardiovascular: Negative for chest pain, palpitations and leg swelling  Gastrointestinal: Negative for abdominal distention, abdominal pain, blood in stool, constipation, diarrhea, nausea and vomiting  Endocrine: Negative for cold intolerance, heat intolerance, polydipsia, polyphagia and polyuria  Genitourinary: Negative for difficulty urinating, dysuria, frequency, hematuria and urgency  Musculoskeletal: Negative for arthralgias, back pain, gait problem, joint swelling, myalgias, neck pain and neck stiffness  Skin: Negative for color change and rash  Neurological: Positive for numbness  Negative for dizziness, tremors, seizures, syncope, speech difficulty, weakness, light-headedness and headaches  Hematological: Negative for adenopathy  Does not bruise/bleed easily  Psychiatric/Behavioral: Negative for agitation, behavioral problems, confusion, hallucinations, sleep disturbance and suicidal ideas  The patient is not nervous/anxious         Past Medical History:     Past Medical History:   Diagnosis Date    [facility]     Chronic pain disorder     lower back    Colon polyp     History of chemotherapy 02/01/2016    Incisional hernia     Malignant neoplasm of sigmoid [address] ([facility] [address] ) 10/2015      Past Surgical History:     Past Surgical History:   Procedure Laterality Date    COLECTOMY      partial    COLONOSCOPY      EXPLORATORY LAPAROTOMY      HERNIA REPAIR      LAPAROSCOPIC LYSIS INTESTINAL ADHESIONS      LAPAROSCOPIC SMALL BOWEL RESECTION      CA COLONOSCOPY FLX DX W/COLLJ SPEC WHEN PFRMD N/A 9/15/2017    Procedure: COLONOSCOPY;  Surgeon: Trung Barnett MD;  Location: [facility];   Service: Gastroenterology    SIGMOIDOSCOPY  10/2015      Social History:     Social History     Socioeconomic History    Marital status: /Civil Union     Spouse name: None    Number of children: None    Years of education: None    Highest education level: None   Occupational History    None   Tobacco Use    Smoking status: Former Smoker     Packs/day: 1 00     Years: 0 00     Pack years: 0 00     Types: Cigarettes     Quit date: 9/15/2015     Years since quittin 4    Smokeless tobacco: Never Used    Tobacco comment: per pt does not remember total years as a smoker   Substance and Sexual Activity    Alcohol use: No    Drug use: No    Sexual activity: Yes     Partners: Male     Birth control/protection: Post-menopausal   Other Topics Concern    None   Social History Narrative    None     Social Determinants of Health     Financial Resource Strain: Not on file   Food Insecurity: Not on file   Transportation Needs: Not on file   Physical Activity: Not on file   Stress: Not on file   Social Connections: Not on file   Intimate Partner Violence: Not on file   Housing Stability: Not on file      Family History:     Family History   Family history unknown: Yes      Current Medications:     Current Outpatient Medications   Medication Sig Dispense Refill    gabapentin (NEURONTIN) 300 mg capsule Take 300 mg by mouth 4 (four) times a day      vitamin B-12 (VITAMIN B-12) 1,000 mcg tablet Take by mouth daily      metoprolol succinate (TOPROL-XL) 25 mg 24 hr tablet TAKE 1 TABLET BY MOUTH DAILY (Patient not taking: Reported on 2/21/2022) 30 tablet 4     No current facility-administered medications for this visit  Allergies:     No Known Allergies   Physical Exam:     /68 (BP Location: Left arm, Patient Position: Sitting, Cuff Size: Standard)   Pulse 88   Temp 98 8 °F (37 1 °C) (Tympanic)   Resp 20   Ht 5' 5" (1 651 m)   Wt 69 9 kg (154 lb)   SpO2 100%   BMI 25 63 kg/m²     Physical Exam  Vitals reviewed  Constitutional:       General: She is not in acute distress  Appearance: She is well-developed  She is not diaphoretic  HENT:      Right Ear: Tympanic membrane, ear canal and external ear normal  There is no impacted cerumen  Left Ear: Tympanic membrane, ear canal and external ear normal  There is no impacted cerumen  Eyes:      General: No scleral icterus  Right eye: No discharge  Left eye: No discharge  Conjunctiva/sclera: Conjunctivae normal    Neck:      Thyroid: No thyromegaly  Vascular: No JVD  Cardiovascular:      Rate and Rhythm: Normal rate and regular rhythm  Heart sounds: Normal heart sounds  No murmur heard  Pulmonary:      Effort: Pulmonary effort is normal  No respiratory distress  Breath sounds: Normal breath sounds  No wheezing or rales  Abdominal:      General: Bowel sounds are normal  There is no distension  Palpations: Abdomen is soft  Tenderness: There is no abdominal tenderness  Musculoskeletal:      Cervical back: Neck supple  Right lower leg: No edema  Left lower leg: No edema  Lymphadenopathy:      Cervical: No cervical adenopathy  Skin:     General: Skin is warm and dry  Comments: Dryness skin   Neurological:      Mental Status: She is alert     Psychiatric:         Mood and Affect: Mood normal          Behavior: Behavior normal           Shanita Sheriff DO  MEDICAL [id number]

## 2022-02-21 NOTE — PATIENT INSTRUCTIONS

## 2022-02-22 ENCOUNTER — TELEPHONE (OUTPATIENT)
Dept: ADMINISTRATIVE | Facility: OTHER | Age: 62
End: 2022-02-22

## 2022-02-22 NOTE — TELEPHONE ENCOUNTER
Upon review of the In Basket request we are not able to locate any mammogram documentation for 2021  Any additional questions or concerns should be emailed to the Practice Liaisons via [email or web address] email, please do not reply via In Basket      Thank you  Colletta Sequin

## 2022-11-09 ENCOUNTER — APPOINTMENT (OUTPATIENT)
Dept: LAB | Facility: CLINIC | Age: 62
End: 2022-11-09

## 2022-11-09 DIAGNOSIS — C18.7 MALIGNANT NEOPLASM OF SIGMOID COLON (HCC): ICD-10-CM

## 2022-11-09 LAB
ALBUMIN SERPL BCP-MCNC: 3.8 G/DL (ref 3.5–5)
ALP SERPL-CCNC: 94 U/L (ref 46–116)
ALT SERPL W P-5'-P-CCNC: 23 U/L (ref 12–78)
ANION GAP SERPL CALCULATED.3IONS-SCNC: 4 MMOL/L (ref 4–13)
AST SERPL W P-5'-P-CCNC: 15 U/L (ref 5–45)
BASOPHILS # BLD AUTO: 0.09 THOUSANDS/ÂΜL (ref 0–0.1)
BASOPHILS NFR BLD AUTO: 1 % (ref 0–1)
BILIRUB SERPL-MCNC: 0.51 MG/DL (ref 0.2–1)
BUN SERPL-MCNC: 13 MG/DL (ref 5–25)
CALCIUM SERPL-MCNC: 9.5 MG/DL (ref 8.3–10.1)
CEA SERPL-MCNC: 0.9 NG/ML (ref 0–3)
CHLORIDE SERPL-SCNC: 110 MMOL/L (ref 96–108)
CO2 SERPL-SCNC: 26 MMOL/L (ref 21–32)
CREAT SERPL-MCNC: 0.91 MG/DL (ref 0.6–1.3)
EOSINOPHIL # BLD AUTO: 0.09 THOUSAND/ÂΜL (ref 0–0.61)
EOSINOPHIL NFR BLD AUTO: 1 % (ref 0–6)
ERYTHROCYTE [DISTWIDTH] IN BLOOD BY AUTOMATED COUNT: 13 % (ref 11.6–15.1)
GFR SERPL CREATININE-BSD FRML MDRD: 67 ML/MIN/1.73SQ M
GLUCOSE P FAST SERPL-MCNC: 97 MG/DL (ref 65–99)
HCT VFR BLD AUTO: 46.1 % (ref 34.8–46.1)
HGB BLD-MCNC: 14.1 G/DL (ref 11.5–15.4)
IMM GRANULOCYTES # BLD AUTO: 0.02 THOUSAND/UL (ref 0–0.2)
IMM GRANULOCYTES NFR BLD AUTO: 0 % (ref 0–2)
LYMPHOCYTES # BLD AUTO: 1.07 THOUSANDS/ÂΜL (ref 0.6–4.47)
LYMPHOCYTES NFR BLD AUTO: 14 % (ref 14–44)
MCH RBC QN AUTO: 28.3 PG (ref 26.8–34.3)
MCHC RBC AUTO-ENTMCNC: 30.6 G/DL (ref 31.4–37.4)
MCV RBC AUTO: 93 FL (ref 82–98)
MONOCYTES # BLD AUTO: 0.76 THOUSAND/ÂΜL (ref 0.17–1.22)
MONOCYTES NFR BLD AUTO: 10 % (ref 4–12)
NEUTROPHILS # BLD AUTO: 5.68 THOUSANDS/ÂΜL (ref 1.85–7.62)
NEUTS SEG NFR BLD AUTO: 74 % (ref 43–75)
NRBC BLD AUTO-RTO: 0 /100 WBCS
PLATELET # BLD AUTO: 195 THOUSANDS/UL (ref 149–390)
PMV BLD AUTO: 11.7 FL (ref 8.9–12.7)
POTASSIUM SERPL-SCNC: 4.2 MMOL/L (ref 3.5–5.3)
PROT SERPL-MCNC: 7.8 G/DL (ref 6.4–8.4)
RBC # BLD AUTO: 4.98 MILLION/UL (ref 3.81–5.12)
SODIUM SERPL-SCNC: 140 MMOL/L (ref 135–147)
WBC # BLD AUTO: 7.71 THOUSAND/UL (ref 4.31–10.16)

## 2024-03-12 DIAGNOSIS — R21 RASH: ICD-10-CM

## 2024-03-12 RX ORDER — TRIAMCINOLONE ACETONIDE 1 MG/G
CREAM TOPICAL
Qty: 30 G | Refills: 2 | OUTPATIENT
Start: 2024-03-12

## 2024-05-15 DIAGNOSIS — R21 RASH: ICD-10-CM

## 2024-05-15 RX ORDER — TRIAMCINOLONE ACETONIDE 1 MG/G
CREAM TOPICAL
Qty: 30 G | Refills: 2 | OUTPATIENT
Start: 2024-05-15

## 2024-10-29 ENCOUNTER — TELEPHONE (OUTPATIENT)
Dept: GASTROENTEROLOGY | Facility: CLINIC | Age: 64
End: 2024-10-29